# Patient Record
Sex: FEMALE | Race: WHITE | Employment: FULL TIME | URBAN - METROPOLITAN AREA
[De-identification: names, ages, dates, MRNs, and addresses within clinical notes are randomized per-mention and may not be internally consistent; named-entity substitution may affect disease eponyms.]

---

## 2019-02-09 ENCOUNTER — APPOINTMENT (OUTPATIENT)
Dept: GENERAL RADIOLOGY | Age: 24
End: 2019-02-09

## 2019-02-09 ENCOUNTER — HOSPITAL ENCOUNTER (EMERGENCY)
Age: 24
Discharge: HOME | End: 2019-02-09

## 2019-02-09 VITALS
TEMPERATURE: 99.6 F | DIASTOLIC BLOOD PRESSURE: 91 MMHG | OXYGEN SATURATION: 93 % | HEIGHT: 66 IN | WEIGHT: 280 LBS | RESPIRATION RATE: 24 BRPM | SYSTOLIC BLOOD PRESSURE: 124 MMHG | BODY MASS INDEX: 45 KG/M2 | HEART RATE: 113 BPM

## 2019-02-09 DIAGNOSIS — J06.9 ACUTE UPPER RESPIRATORY INFECTION: Primary | ICD-10-CM

## 2019-02-09 LAB
RAPID INFLUENZA  B AGN: NEGATIVE
RAPID INFLUENZA A AGN: NEGATIVE
S PYO AG THROAT QL: NEGATIVE

## 2019-02-09 PROCEDURE — 87081 CULTURE SCREEN ONLY: CPT

## 2019-02-09 PROCEDURE — 94664 DEMO&/EVAL PT USE INHALER: CPT

## 2019-02-09 PROCEDURE — 6370000000 HC RX 637 (ALT 250 FOR IP): Performed by: PHYSICIAN ASSISTANT

## 2019-02-09 PROCEDURE — 87804 INFLUENZA ASSAY W/OPTIC: CPT

## 2019-02-09 PROCEDURE — 71046 X-RAY EXAM CHEST 2 VIEWS: CPT

## 2019-02-09 PROCEDURE — 87880 STREP A ASSAY W/OPTIC: CPT

## 2019-02-09 PROCEDURE — 99283 EMERGENCY DEPT VISIT LOW MDM: CPT

## 2019-02-09 PROCEDURE — 94640 AIRWAY INHALATION TREATMENT: CPT

## 2019-02-09 RX ORDER — PREDNISONE 10 MG/1
50 TABLET ORAL DAILY
Qty: 20 TABLET | Refills: 0 | Status: SHIPPED | OUTPATIENT
Start: 2019-02-09 | End: 2019-02-13

## 2019-02-09 RX ORDER — ALBUTEROL SULFATE 90 UG/1
2 AEROSOL, METERED RESPIRATORY (INHALATION) EVERY 4 HOURS PRN
Qty: 1 INHALER | Refills: 1 | Status: SHIPPED | OUTPATIENT
Start: 2019-02-09 | End: 2022-04-28 | Stop reason: SDUPTHER

## 2019-02-09 RX ORDER — IPRATROPIUM BROMIDE AND ALBUTEROL SULFATE 2.5; .5 MG/3ML; MG/3ML
3 SOLUTION RESPIRATORY (INHALATION) ONCE
Status: COMPLETED | OUTPATIENT
Start: 2019-02-09 | End: 2019-02-09

## 2019-02-09 RX ORDER — CODEINE PHOSPHATE AND GUAIFENESIN 10; 100 MG/5ML; MG/5ML
10 SOLUTION ORAL 3 TIMES DAILY PRN
Qty: 150 ML | Refills: 0 | Status: SHIPPED | OUTPATIENT
Start: 2019-02-09 | End: 2019-02-14

## 2019-02-09 RX ORDER — IBUPROFEN 200 MG
200 TABLET ORAL EVERY 6 HOURS PRN
COMMUNITY

## 2019-02-09 RX ORDER — PREDNISONE 20 MG/1
60 TABLET ORAL ONCE
Status: COMPLETED | OUTPATIENT
Start: 2019-02-09 | End: 2019-02-09

## 2019-02-09 RX ORDER — ACETAMINOPHEN 500 MG
1000 TABLET ORAL ONCE
Status: COMPLETED | OUTPATIENT
Start: 2019-02-09 | End: 2019-02-09

## 2019-02-09 RX ADMIN — ACETAMINOPHEN 1000 MG: 500 TABLET, FILM COATED ORAL at 13:16

## 2019-02-09 RX ADMIN — PREDNISONE 60 MG: 20 TABLET ORAL at 13:16

## 2019-02-09 RX ADMIN — IPRATROPIUM BROMIDE AND ALBUTEROL SULFATE 3 AMPULE: .5; 3 SOLUTION RESPIRATORY (INHALATION) at 12:56

## 2019-02-09 ASSESSMENT — PAIN DESCRIPTION - PAIN TYPE: TYPE: ACUTE PAIN

## 2019-02-09 ASSESSMENT — PAIN SCALES - GENERAL
PAINLEVEL_OUTOF10: 9
PAINLEVEL_OUTOF10: 9

## 2019-02-09 ASSESSMENT — PAIN DESCRIPTION - LOCATION: LOCATION: GENERALIZED

## 2019-02-11 LAB — S PYO THROAT QL CULT: NORMAL

## 2021-02-23 ENCOUNTER — OFFICE VISIT (OUTPATIENT)
Dept: FAMILY MEDICINE CLINIC | Age: 26
End: 2021-02-23
Payer: COMMERCIAL

## 2021-02-23 VITALS
SYSTOLIC BLOOD PRESSURE: 123 MMHG | HEART RATE: 75 BPM | DIASTOLIC BLOOD PRESSURE: 79 MMHG | OXYGEN SATURATION: 98 % | HEIGHT: 66 IN | WEIGHT: 260 LBS | BODY MASS INDEX: 41.78 KG/M2 | TEMPERATURE: 97.3 F

## 2021-02-23 DIAGNOSIS — J45.41 MODERATE PERSISTENT ASTHMA WITH ACUTE EXACERBATION: ICD-10-CM

## 2021-02-23 DIAGNOSIS — J20.9 ACUTE BRONCHITIS, UNSPECIFIED ORGANISM: ICD-10-CM

## 2021-02-23 DIAGNOSIS — Z13.29 SCREENING FOR THYROID DISORDER: ICD-10-CM

## 2021-02-23 DIAGNOSIS — Z00.00 ENCOUNTER FOR PREVENTIVE HEALTH EXAMINATION: Primary | ICD-10-CM

## 2021-02-23 PROBLEM — E66.01 MORBID OBESITY WITH BODY MASS INDEX (BMI) OF 40.0 TO 44.9 IN ADULT (HCC): Status: ACTIVE | Noted: 2021-02-23

## 2021-02-23 PROCEDURE — 99385 PREV VISIT NEW AGE 18-39: CPT | Performed by: NURSE PRACTITIONER

## 2021-02-23 RX ORDER — ACETAMINOPHEN 500 MG
500 TABLET ORAL EVERY 6 HOURS PRN
COMMUNITY
Start: 2021-01-30 | End: 2021-02-23

## 2021-02-23 RX ORDER — PREDNISONE 20 MG/1
TABLET ORAL
COMMUNITY
Start: 2021-02-19 | End: 2021-02-23

## 2021-02-23 RX ORDER — METHYLPREDNISOLONE 4 MG/1
TABLET ORAL
COMMUNITY
Start: 2021-02-01 | End: 2021-02-23

## 2021-02-23 RX ORDER — AMOXICILLIN 500 MG/1
CAPSULE ORAL
COMMUNITY
Start: 2020-12-30 | End: 2021-02-23

## 2021-02-23 RX ORDER — METHOCARBAMOL 500 MG/1
TABLET, FILM COATED ORAL
COMMUNITY
Start: 2021-01-30 | End: 2021-02-23

## 2021-02-23 RX ORDER — NAPROXEN 500 MG/1
TABLET ORAL
COMMUNITY
Start: 2021-01-30 | End: 2022-04-28

## 2021-02-23 RX ORDER — AZITHROMYCIN 250 MG/1
250 TABLET, FILM COATED ORAL SEE ADMIN INSTRUCTIONS
Qty: 6 TABLET | Refills: 0 | Status: SHIPPED | OUTPATIENT
Start: 2021-02-23 | End: 2021-02-28

## 2021-02-23 RX ORDER — MONTELUKAST SODIUM 10 MG/1
TABLET ORAL
COMMUNITY
Start: 2021-02-20 | End: 2022-04-28

## 2021-02-23 RX ORDER — DEXTROMETHORPHAN HYDROBROMIDE AND PROMETHAZINE HYDROCHLORIDE 15; 6.25 MG/5ML; MG/5ML
5 SYRUP ORAL
COMMUNITY
End: 2021-02-23

## 2021-02-23 RX ORDER — PREDNISONE 20 MG/1
20 TABLET ORAL 2 TIMES DAILY
Qty: 10 TABLET | Refills: 0 | Status: SHIPPED | OUTPATIENT
Start: 2021-02-23 | End: 2021-02-28

## 2021-02-23 RX ORDER — AZITHROMYCIN 500 MG/1
TABLET, FILM COATED ORAL
COMMUNITY
Start: 2021-01-31 | End: 2021-02-23

## 2021-02-23 RX ORDER — IPRATROPIUM BROMIDE 42 UG/1
SPRAY, METERED NASAL
COMMUNITY
Start: 2021-02-20 | End: 2022-04-28

## 2021-02-23 ASSESSMENT — ENCOUNTER SYMPTOMS
CHEST TIGHTNESS: 1
SHORTNESS OF BREATH: 1
WHEEZING: 1
COUGH: 1

## 2021-02-23 NOTE — PROGRESS NOTES
Scarlett Welch  : 1995  Encounter date: 2021    This sue 23 y.o. female who presents with  Chief Complaint   Patient presents with    Establish Care     request thyroid lab work & discuss asthma        History of present illness:    HPI   History and Physical      Scarlett Welch  YOB: 1995    Date of Service:  2021    Chief Complaint:   Scarlett Welch is a 23 y.o. female who  presents for physical examination. HPI: Pt is 23year old female for annual exam, new to practice, due for labs. Pt reports concerns about uncontrolled asthma, reports taking albuterol HFA, singulair and started on Dulera but insurance does not cover. Pt reports has had to go to ED/urgent care for asthma attacks in past.  Pt reports 2 episodes in last month.      Wt Readings from Last 3 Encounters:   21 260 lb (117.9 kg)   19 280 lb (127 kg)     BP Readings from Last 3 Encounters:   21 123/79   19 (!) 124/91       Patient Active Problem List   Diagnosis    Moderate persistent asthma with acute exacerbation    Morbid obesity with body mass index (BMI) of 40.0 to 44.9 in adult [facility])       Preventive Care:  Health Maintenance   Topic Date Due    Hepatitis C screen  1997    Varicella vaccine (2 of 2 - 2-dose childhood series) 2001    Pneumococcal 0-64 years Vaccine (1 of 1 - PPSV23) 2003    HPV vaccine (1 - 2-dose series) 2008    DTaP/Tdap/Td vaccine (6 - Tdap) 2008    HIV screen  2012    Chlamydia screen  2013    Cervical cancer screen  2018    Flu vaccine (1) 2020    Hepatitis B vaccine  Completed    Hib vaccine  Completed    Hepatitis A vaccine  Aged Out    Meningococcal (ACWY) vaccine  Aged Out      Hx abnormal PAP: no  Sexual activity: none   Self-breast exams: yes  Previous DEXA scan: no  Last eye exam:   Exercise: no regular exercise  Lipid panel: No results found for: CHOL, TRIG, HDL, LDLCALC, LDLDIRECT Substance and Sexual Activity    Alcohol use: No    Drug use: Not on file    Sexual activity: Not on file   Lifestyle    Physical activity     Days per week: Not on file     Minutes per session: Not on file    Stress: Not on file   Relationships    Social connections     Talks on phone: Not on file     Gets together: Not on file     Attends Sikhism service: Not on file     Active member of club or organization: Not on file     Attends meetings of clubs or organizations: Not on file     Relationship status: Not on file    Intimate partner violence     Fear of current or ex partner: Not on file     Emotionally abused: Not on file     Physically abused: Not on file     Forced sexual activity: Not on file   Other Topics Concern    Not on file   Social History Narrative    Not on file       Review of Systems:  A comprehensive review of systems was negative except for what was noted in the HPI. Physical Exam:   Vitals:    02/23/21 1226 02/23/21 1239   BP: 123/79    Pulse: 75    Temp: 97.3 °F (36.3 °C)    SpO2:  98%   Weight: 260 lb (117.9 kg)    Height: 5' 6.25\" (1.683 m)      Body mass index is 41.65 kg/m². Constitutional: She is oriented to person, place, and time. She appears well-developed and well-nourished. No distress. HEENT:   Head: Normocephalic and atraumatic. Right Ear: Tympanic membrane, external ear and ear canal normal.   Left Ear: Tympanic membrane, external ear and ear canal normal.   Nose: Nose normal.   Mouth/Throat: Oropharynx is clear and moist, and mucous membranes are normal.  There is no cervical adenopathy. Eyes: Conjunctivae and extraocular motions are normal. Pupils are equal, round, and reactive to light. Neck: Neck supple. No JVD present. Carotid bruit is not present. No mass and no thyromegaly present. Cardiovascular: Normal rate, regular rhythm, normal heart sounds and intact distal pulses. Exam reveals no gallop and no friction rub.   No murmur heard.  Pulmonary/Chest: Effort normal and breath sounds normal. No respiratory distress. She has no wheezes, rhonchi or rales. Abdominal: Soft, non-tender. Bowel sounds and aorta are normal. She exhibits no organomegaly, mass or bruit. Breast exam:  not examined. Musculoskeletal: Normal range of motion, no synovitis. She exhibits no edema. Neurological: She is alert and oriented to person, place, and time. She has normal reflexes. No cranial nerve deficit. Coordination normal.   Skin: Skin is warm and dry. There is no rash or erythema. No suspicious lesions noted. Psychiatric: She has a normal mood and affect. Her speech is normal and behavior is normal. Judgment, cognition and memory are normal.     Assessment/Plan:    El Bullock was seen today for establish care. Diagnoses and all orders for this visit:    Encounter for preventive health examination  -     CBC Auto Differential; Future  -     Comprehensive Metabolic Panel, Fasting; Future  -     Lipid, Fasting; Future  -     HIV Screen; Future  -     HEPATITIS C ANTIBODY; Future    Screening for thyroid disorder  -     TSH with Reflex; Future    Moderate persistent asthma with acute exacerbation  -     fluticasone-salmeterol (ADVAIR HFA) 115-21 MCG/ACT inhaler; Inhale 2 puffs into the lungs 2 times daily    Acute bronchitis, unspecified organism  -     predniSONE (DELTASONE) 20 MG tablet; Take 1 tablet by mouth 2 times daily for 5 days  -     azithromycin (ZITHROMAX) 250 MG tablet;  Take 1 tablet by mouth See Admin Instructions for 5 days 500mg on day 1 followed by 250mg on days 2 - 5              Current Outpatient Medications on File Prior to Visit   Medication Sig Dispense Refill    ipratropium (ATROVENT) 0.06 % nasal spray       montelukast (SINGULAIR) 10 MG tablet       ibuprofen (ADVIL;MOTRIN) 200 MG tablet Take 200 mg by mouth every 6 hours as needed for Pain      naproxen (NAPROSYN) 500 MG tablet TAKE 1 TABLET BY MOUTH TWICE DAILY WITH MEALS  albuterol sulfate HFA (PROVENTIL HFA) 108 (90 Base) MCG/ACT inhaler Inhale 2 puffs into the lungs every 4 hours as needed for Wheezing or Shortness of Breath With spacer (and mask if indicated). Thanks. 1 Inhaler 1     No current facility-administered medications on file prior to visit. No Known Allergies  Past Medical History:   Diagnosis Date    Asthma       History reviewed. No pertinent surgical history. History reviewed. No pertinent family history. Social History     Tobacco Use    Smoking status: Current Every Day Smoker     Packs/day: 0.50    Smokeless tobacco: Never Used   Substance Use Topics    Alcohol use: No        Review of Systems   Constitutional: Negative for fever. Respiratory: Positive for cough (productive), chest tightness, shortness of breath and wheezing. Cardiovascular: Negative for chest pain, palpitations and leg swelling. Objective:    /79   Pulse 75   Temp 97.3 °F (36.3 °C)   Ht 5' 6.25\" (1.683 m)   Wt 260 lb (117.9 kg)   SpO2 98%   BMI 41.65 kg/m²   Weight: 260 lb (117.9 kg)     BP Readings from Last 3 Encounters:   02/23/21 123/79   02/09/19 (!) 124/91     Wt Readings from Last 3 Encounters:   02/23/21 260 lb (117.9 kg)   02/09/19 280 lb (127 kg)     BMI Readings from Last 3 Encounters:   02/23/21 41.65 kg/m²   02/09/19 45.19 kg/m²       Physical Exam    Assessment/Plan    1. Encounter for preventive health examination  Advised routine dental and vision  Advised living will  Advised increase exercise, healthy diet choices  Advised HPV vaccination  - CBC Auto Differential; Future  - Comprehensive Metabolic Panel, Fasting; Future  - Lipid, Fasting; Future  - HIV Screen; Future  - HEPATITIS C ANTIBODY; Future    2. Screening for thyroid disorder  - TSH with Reflex; Future    3. Moderate persistent asthma with acute exacerbation  - fluticasone-salmeterol (ADVAIR HFA) 115-21 MCG/ACT inhaler;  Inhale 2 puffs into the lungs 2 times daily  Dispense: 1 Inhaler; Refill: 3    4. Acute bronchitis, unspecified organism  - predniSONE (DELTASONE) 20 MG tablet; Take 1 tablet by mouth 2 times daily for 5 days  Dispense: 10 tablet; Refill: 0  - azithromycin (ZITHROMAX) 250 MG tablet; Take 1 tablet by mouth See Admin Instructions for 5 days 500mg on day 1 followed by 250mg on days 2 - 5  Dispense: 6 tablet; Refill: 0          Return in about 1 year (around 2/23/2022) for annual check up. This dictation was generated by voice recognition computer software. Although all attempts are made to edit the dictation for accuracy, there may be errors in the transcription that are not intended.

## 2021-08-12 DIAGNOSIS — J45.41 MODERATE PERSISTENT ASTHMA WITH ACUTE EXACERBATION: ICD-10-CM

## 2021-08-12 NOTE — TELEPHONE ENCOUNTER
Medication:   Pending Prescriptions Disp Refills    fluticasone-salmeterol (ADVAIR HFA) 115-21 MCG/ACT inhaler 1 Inhaler 3     Sig: Inhale 2 puffs into the lungs 2 times daily      Patient Phone Number: [phone] (home)     Last appt: 2/23/2021   Next appt: Visit date not found    Preferred Pharmacy:   [facility] [phone] [facility] [phone]

## 2021-12-22 DIAGNOSIS — J45.41 MODERATE PERSISTENT ASTHMA WITH ACUTE EXACERBATION: ICD-10-CM

## 2021-12-22 NOTE — TELEPHONE ENCOUNTER
Medication:   Requested Prescriptions     Pending Prescriptions Disp Refills    fluticasone-salmeterol (ADVAIR HFA) 115-21 MCG/ACT inhaler       Sig: Inhale 2 puffs into the lungs 2 times daily      Last Filled:      Patient Phone Number: [phone] (home)     Last appt: 2/23/2021   Next appt: Visit date not found    Last OARRS: No flowsheet data found.   PDMP Monitoring:    Last PDMP Florinda Marley as Reviewed [facility]):  Review User Review Instant Review Result          Preferred Pharmacy:   [address] [phone] [facility] [phone]  [address]  [facility] [address]  Phone: [phone] Fax: [phone]

## 2022-03-10 DIAGNOSIS — J45.41 MODERATE PERSISTENT ASTHMA WITH ACUTE EXACERBATION: ICD-10-CM

## 2022-03-10 NOTE — TELEPHONE ENCOUNTER
Medication:   Requested Prescriptions     Pending Prescriptions Disp Refills    fluticasone-salmeterol (ADVAIR HFA) 115-21 MCG/ACT inhaler 1 each 0     Sig: Inhale 2 puffs into the lungs 2 times daily **Patient due for an appt**        Last Filled: 12/22/2021      Patient Phone Number: [phone] (home)     Last appt: 2/23/2021   Next appt: Visit date not found    Last OARRS: No flowsheet data found.

## 2022-04-18 DIAGNOSIS — J45.41 MODERATE PERSISTENT ASTHMA WITH ACUTE EXACERBATION: ICD-10-CM

## 2022-04-19 NOTE — TELEPHONE ENCOUNTER
Medication:   Requested Prescriptions     Pending Prescriptions Disp Refills    fluticasone-salmeterol (ADVAIR HFA) 115-21 MCG/ACT inhaler 1 each 0     Sig: Inhale 2 puffs into the lungs 2 times daily **Patient due for an appt**      Last Filled:      Patient Phone Number: [phone] (home)     Last appt: 2/23/2021   Next appt: Visit date not found    Last OARRS: No flowsheet data found.   PDMP Monitoring:    Last PDMP Merced smith Reviewed [facility]):  Review User Review Instant Review Result          Preferred Pharmacy:   [address] [phone] [facility] [phone]  [address]  [address] [place] [id number]  Phone: [phone] Fax: [phone]

## 2022-04-28 ENCOUNTER — OFFICE VISIT (OUTPATIENT)
Dept: FAMILY MEDICINE CLINIC | Age: 27
End: 2022-04-28
Payer: COMMERCIAL

## 2022-04-28 VITALS
DIASTOLIC BLOOD PRESSURE: 88 MMHG | WEIGHT: 275 LBS | OXYGEN SATURATION: 99 % | SYSTOLIC BLOOD PRESSURE: 122 MMHG | HEIGHT: 67 IN | TEMPERATURE: 98 F | BODY MASS INDEX: 43.16 KG/M2 | HEART RATE: 91 BPM

## 2022-04-28 DIAGNOSIS — J45.41 MODERATE PERSISTENT ASTHMA WITH ACUTE EXACERBATION: ICD-10-CM

## 2022-04-28 DIAGNOSIS — F32.A DEPRESSION, UNSPECIFIED DEPRESSION TYPE: ICD-10-CM

## 2022-04-28 DIAGNOSIS — F10.930 ALCOHOL WITHDRAWAL SYNDROME WITHOUT COMPLICATION (HCC): Primary | ICD-10-CM

## 2022-04-28 DIAGNOSIS — R73.9 HYPERGLYCEMIA: ICD-10-CM

## 2022-04-28 DIAGNOSIS — Z00.00 ENCOUNTER FOR PREVENTIVE HEALTH EXAMINATION: Primary | ICD-10-CM

## 2022-04-28 PROBLEM — R50.9 FEVER: Status: ACTIVE | Noted: 2022-04-28

## 2022-04-28 PROBLEM — R05.9 COUGH: Status: ACTIVE | Noted: 2022-04-28

## 2022-04-28 PROBLEM — J01.90 ACUTE SINUSITIS: Status: ACTIVE | Noted: 2022-04-28

## 2022-04-28 PROCEDURE — 99214 OFFICE O/P EST MOD 30 MIN: CPT | Performed by: NURSE PRACTITIONER

## 2022-04-28 RX ORDER — ACYCLOVIR 800 MG/1
TABLET ORAL
COMMUNITY
Start: 2022-04-24

## 2022-04-28 RX ORDER — BUPROPION HYDROCHLORIDE 150 MG/1
150 TABLET ORAL EVERY MORNING
Qty: 30 TABLET | Refills: 0 | Status: SHIPPED | OUTPATIENT
Start: 2022-04-28 | End: 2022-05-27 | Stop reason: SDUPTHER

## 2022-04-28 RX ORDER — HYDROXYZINE PAMOATE 25 MG/1
25 CAPSULE ORAL 2 TIMES DAILY PRN
Qty: 60 CAPSULE | Refills: 0 | Status: SHIPPED | OUTPATIENT
Start: 2022-04-28 | End: 2022-10-13

## 2022-04-28 RX ORDER — ALBUTEROL SULFATE 90 UG/1
2 AEROSOL, METERED RESPIRATORY (INHALATION) EVERY 4 HOURS PRN
Status: CANCELLED | OUTPATIENT
Start: 2022-04-28 | End: 2022-05-28

## 2022-04-28 RX ORDER — ALBUTEROL SULFATE 90 UG/1
2 AEROSOL, METERED RESPIRATORY (INHALATION) EVERY 4 HOURS PRN
Qty: 2 EACH | Refills: 1 | Status: SHIPPED | OUTPATIENT
Start: 2022-04-28 | End: 2022-09-12

## 2022-04-28 SDOH — ECONOMIC STABILITY: FOOD INSECURITY: WITHIN THE PAST 12 MONTHS, THE FOOD YOU BOUGHT JUST DIDN'T LAST AND YOU DIDN'T HAVE MONEY TO GET MORE.: NEVER TRUE

## 2022-04-28 SDOH — ECONOMIC STABILITY: FOOD INSECURITY: WITHIN THE PAST 12 MONTHS, YOU WORRIED THAT YOUR FOOD WOULD RUN OUT BEFORE YOU GOT MONEY TO BUY MORE.: NEVER TRUE

## 2022-04-28 ASSESSMENT — PATIENT HEALTH QUESTIONNAIRE - PHQ9
SUM OF ALL RESPONSES TO PHQ QUESTIONS 1-9: 9
SUM OF ALL RESPONSES TO PHQ QUESTIONS 1-9: 8
9. THOUGHTS THAT YOU WOULD BE BETTER OFF DEAD, OR OF HURTING YOURSELF: 1
SUM OF ALL RESPONSES TO PHQ QUESTIONS 1-9: 9
2. FEELING DOWN, DEPRESSED OR HOPELESS: 1
1. LITTLE INTEREST OR PLEASURE IN DOING THINGS: 2
7. TROUBLE CONCENTRATING ON THINGS, SUCH AS READING THE NEWSPAPER OR WATCHING TELEVISION: 1
3. TROUBLE FALLING OR STAYING ASLEEP: 0
6. FEELING BAD ABOUT YOURSELF - OR THAT YOU ARE A FAILURE OR HAVE LET YOURSELF OR YOUR FAMILY DOWN: 1
4. FEELING TIRED OR HAVING LITTLE ENERGY: 2
SUM OF ALL RESPONSES TO PHQ QUESTIONS 1-9: 9
10. IF YOU CHECKED OFF ANY PROBLEMS, HOW DIFFICULT HAVE THESE PROBLEMS MADE IT FOR YOU TO DO YOUR WORK, TAKE CARE OF THINGS AT HOME, OR GET ALONG WITH OTHER PEOPLE: 1
8. MOVING OR SPEAKING SO SLOWLY THAT OTHER PEOPLE COULD HAVE NOTICED. OR THE OPPOSITE, BEING SO FIGETY OR RESTLESS THAT YOU HAVE BEEN MOVING AROUND A LOT MORE THAN USUAL: 0
5. POOR APPETITE OR OVEREATING: 1
SUM OF ALL RESPONSES TO PHQ9 QUESTIONS 1 & 2: 3

## 2022-04-28 ASSESSMENT — SOCIAL DETERMINANTS OF HEALTH (SDOH): HOW HARD IS IT FOR YOU TO PAY FOR THE VERY BASICS LIKE FOOD, HOUSING, MEDICAL CARE, AND HEATING?: NOT VERY HARD

## 2022-04-28 ASSESSMENT — COLUMBIA-SUICIDE SEVERITY RATING SCALE - C-SSRS
6. HAVE YOU EVER DONE ANYTHING, STARTED TO DO ANYTHING, OR PREPARED TO DO ANYTHING TO END YOUR LIFE?: NO
1. WITHIN THE PAST MONTH, HAVE YOU WISHED YOU WERE DEAD OR WISHED YOU COULD GO TO SLEEP AND NOT WAKE UP?: YES
2. HAVE YOU ACTUALLY HAD ANY THOUGHTS OF KILLING YOURSELF?: NO

## 2022-04-28 NOTE — PATIENT INSTRUCTIONS
Patient Education        Alcohol Detoxification and Withdrawal: Care Instructions  Your Care Instructions     If you drink alcohol regularly and then suddenly stop, you may go through some physical and emotional problems while the alcohol clears out of your system. Clearing the alcohol from your body is called detoxification, or detox. Physical and emotional problems that may happen during detox are calledwithdrawal.  Symptoms of withdrawal can be scary and dangerous. Mild symptoms include nausea and vomiting, sweating, shakiness, and intense worry. Severe symptoms include being confused and irritable, feeling things on your body that are not there, seeing or hearing things that are not there, and trembling. You may even have seizures. If your symptoms become severe you must see a doctor. People who drink large amounts of alcohol should not try to detox at home. A person migdalia of severe alcohol withdrawal.  Symptoms of alcohol withdrawal may begin from 4 to 12 hours after you stop drinking. But they may not start for several days after the last drink. Theycan last a few days. It is hard to stop drinking. But when you have cleared the alcohol from your system, you will be able to start the next part of your life, free from theburden of being dependent. Follow-up care is a key part of your treatment and safety. Be sure to make and go to all appointments, and call your doctor if you are having problems. It's also a good idea to know your test results and keep alist of the medicines you take. How can you care for yourself at home?  Before you stop drinking, talk to your doctor about how you plan to stop. Be sure to be completely honest with him or her about how much you have been drinking. Your doctor will figure out whether you need to detox in a supervised medical center.  Take your medicines exactly as prescribed. Call your doctor if you think you are having a problem with your medicine.    Make sure someone you trust is with you the whole time. Have friends and family members take turns staying with you until you are done with detox.  Put a list of emergency numbers near the phone. This should include your doctor, the police, the nearest hospital and emergency room, and neighbors who can help if needed.  Make sure all alcohol is removed from the house before you start. This includes beverages as well as medicines, rubbing alcohol, and certain flavorings like vanilla extract.  Keep \"drinking buddies\" away during the time you are going through detox.  Make your surroundings calm. Soft lights, soft music, and a comfortable place to sit or lie down can help make the process easier.  Drink lots of fluids and eat snacks such as fruit, cheese and crackers, and pretzels. Foods high in carbohydrate may help reduce the craving for alcohol.  Understand that detox is going to be hard.  Keep in mind that the people watching over you during detox are there to help. Explain to them before you start that you may not act like yourself until detox is finished.  Consider joining a support group such as Alcoholics Anonymous. Sharing your experiences with other people who face similar challenges may help you feel less overwhelmed.  Keep the numbers for these national suicide hotlines: 9-778-507-TALK (6-207.600.1564) and 3-623-HOHLFRH (2-571.876.9532). If you or someone you know talks about suicide or feeling hopeless, get help right away. When should you call for help? Call 911 anytime you think you may need emergency care.  For example, call if:     You feel you cannot stop from hurting yourself or someone else.      You vomit many times and cannot stop.      You vomit blood or what looks like coffee grounds.      You have trouble breathing or are breathing very fast.      Your heart beats more than 120 times a minute and will not slow down.      You have chest pain.      You have a seizure.      You see or feel things that are not there (hallucinate). If you are caring for someone who is going through detox, call if:     The person passes out (loses consciousness).      The person sees or feels things that are not there and sees or hears the same things many times.      The person is very agitated and will not calm down.      The person becomes violent or threatens to be violent.      The person has a seizure. Call your doctor now or seek immediate medical care if:     You have a high fever.      You have severe belly pain.      You are very shaky. Watch closely for changes in your health, and be sure to contact your doctor if:     You do not get better as expected. Where can you learn more? Go to https://AppPowerGrouppepiceweb.[employer]. org and sign in to your [employer] account. Enter [phone] in the CR2 box to learn more about \"Alcohol Detoxification and Withdrawal: Care Instructions. \"     If you do not have an account, please click on the \"Sign Up Now\" link. Current as of: November 8, 2021               Content Version: 13.2  © 2868-9291 [employer]. Care instructions adapted under license by [facility] ([facility]). If you have questions about a medical condition or this instruction, always ask your healthcare professional. [place] [address] any warranty or liability for your use of this information. Patient Education        Learning About Alcohol Use Disorder  What is alcohol use disorder? Alcohol use disorder means that a person drinks alcohol even though it causes harm to themselves or others. It can range from mild to severe. The more signs of this disorder you have, the more severe it may be. Moderate to severe alcohol use disorder is sometimes called addiction. People who have it may findit hard to control their use of alcohol. People who have this disorder may argue with others about how much they're drinking.  Their job may be affected because of drinking. They may drink when it's dangerous or illegal, such as when they drive. They also may have a strong need, or craving, to drink. They may feel like they must drink just to get by. Their drinking may increase their risk of getting hurt or being in a car crash. Over time, drinking too much alcohol may cause health problems. These mayinclude high blood pressure, liver problems, or problems with digestion. What are the signs? Maybe you've wondered about your alcohol habits, or how to tell if yourdrinking is becoming a problem. Here are some of the signs of alcohol use disorder. You may have it if you havetwo or more of the following signs:   You drink larger amounts of alcohol than you ever meant to. Or you've been drinking for a longer time than you ever meant to.  You can't cut down or control your use. Or you constantly wish you could cut down.  You spend a lot of time getting or drinking alcohol or recovering from its effects.  You have strong cravings for alcohol.  You can no longer do your main jobs at work, at school, or at home.  You keep drinking alcohol, even though your use hurts your relationships.  You have stopped doing important activities because of your alcohol use.  You drink alcohol in situations where doing so is dangerous.  You keep drinking alcohol even though you know it's causing health problems.  You need more and more alcohol to get the same effect, or you get less effect from the same amount over time. This is called tolerance.  You have uncomfortable symptoms when you stop drinking alcohol or use less. This is called withdrawal.  Alcohol use disorder can range from mild to severe. The more signs you have, the more severe the disorder may be. Moderate to severe alcohol use disorder issometimes called addiction. You might not realize that your drinking is a problem. You might not drink large amounts when you drink.  Or you might go for days or weeks between drinking episodes. But even if you don't drink very often, your drinking couldstill be harmful and put you at risk. How is alcohol use disorder treated? Getting help is up to you. But you don't have to do it alone. There are manypeople and kinds of treatments that can help. Treatment for alcohol use disorder can include:   Group therapy, one or more types of counseling, and alcohol education.  Medicines that help to:  ? Reduce withdrawal symptoms and help you safely stop drinking. ? Reduce cravings for alcohol.  Support groups. These groups include Alcoholics Anonymous and [employer] (Self-Management and Recovery Training). Some people are able to stop or cut back on drinking with help from a counselor. People who have moderate to severe alcohol use disorder may needmedical treatment. They may need to stay in a hospital or treatment center. You may have a treatment team to help you. This team may include a psychologist or psychiatrist, counselors, doctors, social workers, nurses, and a casemanager. A  helps plan and manage your treatment. Follow-up care is a key part of your treatment and safety. Be sure to make and go to all appointments, and call your doctor if you are having problems. It's also a good idea to know your test results and keep alist of the medicines you take. Where can you learn more? Go to https://St[employer] T[employer] ProductskathieDigly.[employer]. org and sign in to your [employer] account. Enter [id number] in the [facility] box to learn more about \"Learning About Alcohol Use Disorder. \"     If you do not have an account, please click on the \"Sign Up Now\" link. Current as of: November 8, 2021               Content Version: 13.2  © 4583-0021 [employer]. Care instructions adapted under license by [facility] ([facility]).  If you have questions about a medical condition or this instruction, always ask your healthcare professional. [address] any warranty or liability for your use of this information.

## 2022-04-28 NOTE — PROGRESS NOTES
Rosario Gale  : 1995  Encounter date: 2022    This sue 24 y.o. female who presents with  Chief Complaint   Patient presents with    Other     alcoholism, mental health concerns       History of present illness:    HPI PT is 24year old female with concerns regarding alcoholism and depression. Denies feelings of self harm or suicide. PT has never addressed issues. Reports mother is recovering IV drug user, lives with her in home. Currently employed. Pt reports only 1 close friend who does not engage in excessive drinking, mother has voiced concerns about alcoholic consumption but will at times drink with patient. Pt reports consuming 0.5 liter vodka every other day. Denies legal ramifications, has experienced black outs and reports feeling hung over and sick the following days. Pt has tried to decrease drinking but is worrisome of withdrawal, has not experienced DT's or withdrawal symptoms in past.  Pt with history of regular habit of drinking poppy seed tea, ED visit 11/10/2020 for drug consumption. Pt reports wanting to stop drinking ETOH, knows the physical consequences. PT reports isolating self and having little drive. Pt most likely with significant childhood concerns. 2017 ED visit for suicidal ideation without plan. Pt reports having never been treated with psychotropics, ED mention of possible Adderal use. Current Outpatient Medications on File Prior to Visit   Medication Sig Dispense Refill    acyclovir (ZOVIRAX) 800 MG tablet TAKE 1 TABLET BY MOUTH TWICE A DAY ( EVERY 12 HOURS ) FOR 5 DAYS AT THE OUTBREAK OF SYMPTOMS      ibuprofen (ADVIL;MOTRIN) 200 MG tablet Take 200 mg by mouth every 6 hours as needed for Pain       No current facility-administered medications on file prior to visit. No Known Allergies  Past Medical History:   Diagnosis Date    Asthma       History reviewed. No pertinent surgical history.    Family History   Problem Relation Age of Onset    Asthma Sister     Immune Disorder Sister     Mental Illness Sister     Obesity Sister     Asthma Father     Mental Illness Father     Obesity Father     Breast Cancer Maternal Grandmother     Diabetes Maternal Grandmother     Obesity Maternal Grandmother     Diabetes Paternal Grandfather     Mental Illness Mother     Obesity Mother     Substance Abuse Mother     Substance Abuse Maternal Aunt       Social History     Tobacco Use    Smoking status: Former Smoker     Packs/day: 0.50     Years: 8.00     Pack years: 4.00     Types: Cigarettes    Smokeless tobacco: Never Used   Substance Use Topics    Alcohol use: Yes     Alcohol/week: 0.0 standard drinks        Review of Systems    Objective:    /88 (Site: Left Upper Arm, Position: Sitting, Cuff Size: Large Adult)   Pulse 91   Temp 98 °F (36.7 °C) (Infrared)   Ht 5' 6.5\" (1.689 m)   Wt 275 lb (124.7 kg)   LMP 04/08/2022   SpO2 99%   BMI 43.72 kg/m²   Weight: 275 lb (124.7 kg)     BP Readings from Last 3 Encounters:   04/28/22 122/88   02/23/21 123/79   02/09/19 (!) 124/91     Wt Readings from Last 3 Encounters:   04/28/22 275 lb (124.7 kg)   02/23/21 260 lb (117.9 kg)   02/09/19 280 lb (127 kg)     BMI Readings from Last 3 Encounters:   04/28/22 43.72 kg/m²   02/23/21 41.65 kg/m²   02/09/19 45.19 kg/m²       Physical Exam  Constitutional:       Appearance: Normal appearance. She is obese. Cardiovascular:      Rate and Rhythm: Normal rate and regular rhythm. Pulses: Normal pulses. Heart sounds: Normal heart sounds. Skin:     General: Skin is warm and dry. Neurological:      Mental Status: She is oriented to person, place, and time. Psychiatric:      Comments: Flat affect         Assessment/Plan    1.  Alcohol withdrawal syndrome without complication ([address]  Advised support system  Discussed need for AA and sponsor  Provided information for [facility]  Discussed s/s ETOH withdrawal and medical emergency  Provided information for cessation and withdrawal  - buPROPion (WELLBUTRIN XL) 150 MG extended release tablet; Take 1 tablet by mouth every morning  Dispense: 30 tablet; Refill: 0  - hydrOXYzine (VISTARIL) 25 MG capsule; Take 1 capsule by mouth 2 times daily as needed for Anxiety  Dispense: 60 capsule; Refill: 0    2. Depression, unspecified depression type  Advised counseling  Self care  - hydrOXYzine (VISTARIL) 25 MG capsule; Take 1 capsule by mouth 2 times daily as needed for Anxiety  Dispense: 60 capsule; Refill: 0    3. Moderate persistent asthma with acute exacerbation  Refill sent  - fluticasone-salmeterol (ADVAIR HFA) 115-21 MCG/ACT inhaler; Inhale 2 puffs into the lungs 2 times daily  Dispense: 2 each; Refill: 1  - albuterol sulfate HFA (PROVENTIL HFA) 108 (90 Base) MCG/ACT inhaler; Inhale 2 puffs into the lungs every 4 hours as needed for Wheezing or Shortness of Breath With spacer (and mask if indicated). Thanks. Dispense: 2 each; Refill: 1      Return in about 1 month (around 5/28/2022) for medication re-check. This dictation was generated by voice recognition computer software. Although all attempts are made to edit the dictation for accuracy, there may be errors in the transcription that are not intended.

## 2022-05-01 LAB
A/G RATIO: 2 (ref 1.2–2.2)
ALBUMIN SERPL-MCNC: 4.3 G/DL (ref 3.9–5)
ALP BLD-CCNC: 64 IU/L (ref 44–121)
ALT SERPL-CCNC: 17 IU/L (ref 0–32)
AST SERPL-CCNC: 19 IU/L (ref 0–40)
BASOPHILS ABSOLUTE: 0.1 X10E3/UL (ref 0–0.2)
BASOPHILS RELATIVE PERCENT: 1 %
BILIRUB SERPL-MCNC: 0.4 MG/DL (ref 0–1.2)
BUN / CREAT RATIO: 20 (ref 9–23)
BUN BLDV-MCNC: 14 MG/DL (ref 6–20)
CALCIUM SERPL-MCNC: 9.1 MG/DL (ref 8.7–10.2)
CHLORIDE BLD-SCNC: 106 MMOL/L (ref 96–106)
CHOLESTEROL, TOTAL: 135 MG/DL (ref 100–199)
CO2: 20 MMOL/L (ref 20–29)
COMMENT: NORMAL
CREAT SERPL-MCNC: 0.69 MG/DL (ref 0.57–1)
EOSINOPHILS ABSOLUTE: 0.4 X10E3/UL (ref 0–0.4)
EOSINOPHILS RELATIVE PERCENT: 5 %
ERYTHROCYTES, NUCLEATED/100 LEU: NORMAL
ESTIMATED GLOMERULAR FILTRATION RATE CREATININE EQUATION: 123 ML/MIN/1.73
GLOBULIN: 2.1 G/DL (ref 1.5–4.5)
GLUCOSE BLD-MCNC: 86 MG/DL (ref 65–99)
HBA1C MFR BLD: 5.2 % (ref 4.8–5.6)
HCT VFR BLD CALC: 40.9 % (ref 34–46.6)
HDLC SERPL-MCNC: 55 MG/DL
HEMOGLOBIN: 13.4 G/DL (ref 11.1–15.9)
IMMATURE CELLS ABSOLUTE COUNT: NORMAL
IMMATURE GRANS (ABS): 0 X10E3/UL (ref 0–0.1)
IMMATURE GRANULOCYTES: 0 %
LDL CHOLESTEROL CALCULATED: 63 MG/DL (ref 0–99)
LYMPHOCYTES ABSOLUTE: 1.9 X10E3/UL (ref 0.7–3.1)
LYMPHOCYTES RELATIVE PERCENT: 28 %
MAGNESIUM: 2.1 MG/DL (ref 1.6–2.3)
MCH RBC QN AUTO: 30.9 PG (ref 26.6–33)
MCHC RBC AUTO-ENTMCNC: 32.8 G/DL (ref 31.5–35.7)
MCV RBC AUTO: 94 FL (ref 79–97)
MONOCYTES ABSOLUTE: 0.6 X10E3/UL (ref 0.1–0.9)
MONOCYTES RELATIVE PERCENT: 9 %
MORPHOLOGY: NORMAL
NEUTROPHILS ABSOLUTE: 3.9 X10E3/UL (ref 1.4–7)
PDW BLD-RTO: 12.7 % (ref 11.7–15.4)
PLATELET # BLD: 252 X10E3/UL (ref 150–450)
POTASSIUM SERPL-SCNC: 4.3 MMOL/L (ref 3.5–5.2)
RBC # BLD: 4.34 X10E6/UL (ref 3.77–5.28)
SEGMENTED NEUTROPHILS RELATIVE PERCENT: 57 %
SODIUM BLD-SCNC: 141 MMOL/L (ref 134–144)
TOTAL PROTEIN: 6.4 G/DL (ref 6–8.5)
TRIGL SERPL-MCNC: 91 MG/DL (ref 0–149)
VLDLC SERPL CALC-MCNC: 17 MG/DL (ref 5–40)
WBC # BLD: 6.7 X10E3/UL (ref 3.4–10.8)

## 2022-05-27 DIAGNOSIS — F10.930 ALCOHOL WITHDRAWAL SYNDROME WITHOUT COMPLICATION (HCC): ICD-10-CM

## 2022-05-27 NOTE — TELEPHONE ENCOUNTER
Medication:   Requested Prescriptions     Pending Prescriptions Disp Refills    buPROPion (WELLBUTRIN XL) 150 MG extended release tablet 30 tablet 0     Sig: Take 1 tablet by mouth every morning          Patient Phone Number: [phone] (home)     Last appt: 4/28/2022   Next appt: 6/6/2022    Last OARRS: No flowsheet data found.   PDMP Monitoring:    Last PDMP David Rhoades as Reviewed [facility]):  Review User Review Instant Review Result          Preferred Pharmacy:   [address] [phone] Lelo Cruz [phone]  [address] [facility] [address]  Phone: [phone] Fax: [phone]

## 2022-05-28 PROBLEM — R05.9 COUGH: Status: RESOLVED | Noted: 2022-04-28 | Resolved: 2022-05-28

## 2022-05-30 RX ORDER — BUPROPION HYDROCHLORIDE 150 MG/1
150 TABLET ORAL EVERY MORNING
Qty: 30 TABLET | Refills: 0 | Status: SHIPPED | OUTPATIENT
Start: 2022-05-30 | End: 2022-06-06

## 2022-06-06 ENCOUNTER — OFFICE VISIT (OUTPATIENT)
Dept: FAMILY MEDICINE CLINIC | Age: 27
End: 2022-06-06
Payer: COMMERCIAL

## 2022-06-06 VITALS
TEMPERATURE: 97.4 F | OXYGEN SATURATION: 98 % | HEART RATE: 92 BPM | WEIGHT: 279 LBS | SYSTOLIC BLOOD PRESSURE: 134 MMHG | BODY MASS INDEX: 44.36 KG/M2 | DIASTOLIC BLOOD PRESSURE: 72 MMHG

## 2022-06-06 DIAGNOSIS — F33.1 MODERATE EPISODE OF RECURRENT MAJOR DEPRESSIVE DISORDER (HCC): Primary | ICD-10-CM

## 2022-06-06 PROBLEM — F10.930 ALCOHOL WITHDRAWAL SYNDROME WITHOUT COMPLICATION (HCC): Status: ACTIVE | Noted: 2022-06-06

## 2022-06-06 PROCEDURE — 99213 OFFICE O/P EST LOW 20 MIN: CPT | Performed by: NURSE PRACTITIONER

## 2022-06-06 RX ORDER — BUPROPION HYDROCHLORIDE 150 MG/1
300 TABLET ORAL EVERY MORNING
Qty: 30 TABLET | Refills: 0
Start: 2022-06-06 | End: 2022-07-27 | Stop reason: ALTCHOICE

## 2022-06-06 NOTE — PROGRESS NOTES
Louis Berger  : 1995  Encounter date: 2022    This sue 24 y.o. female who presents with  Chief Complaint   Patient presents with    Medication Refill     new med not working well       History of present illness:    HPI Pt is 24year old female for medication recheck on wellbutrin and vistaril, started 2 months ago. Pt with concerns for alcohol abuse, depression and anxiety. Labs initially completed, NL. Pt reports not having any positive affects from wellbutrin, reports very emotional in beginning but resolved. Reports being more social lately, going to concerts and festivals. Pt reports sister is currently taking lexapro. Pt reports continues to drink ETOH daily if not every other day, has stopped liquor and has started seltzer beverages, reports it helps her socially, as a crutch. Eventually would like to wean off completely. Current Outpatient Medications on File Prior to Visit   Medication Sig Dispense Refill    acyclovir (ZOVIRAX) 800 MG tablet TAKE 1 TABLET BY MOUTH TWICE A DAY ( EVERY 12 HOURS ) FOR 5 DAYS AT THE OUTBREAK OF SYMPTOMS      fluticasone-salmeterol (ADVAIR HFA) 115-21 MCG/ACT inhaler Inhale 2 puffs into the lungs 2 times daily 2 each 1    albuterol sulfate HFA (PROVENTIL HFA) 108 (90 Base) MCG/ACT inhaler Inhale 2 puffs into the lungs every 4 hours as needed for Wheezing or Shortness of Breath With spacer (and mask if indicated). Thanks. 2 each 1    ibuprofen (ADVIL;MOTRIN) 200 MG tablet Take 200 mg by mouth every 6 hours as needed for Pain       No current facility-administered medications on file prior to visit. No Known Allergies  Past Medical History:   Diagnosis Date    Asthma       History reviewed. No pertinent surgical history.    Family History   Problem Relation Age of Onset    Asthma Sister     Immune Disorder Sister     Mental Illness Sister     Obesity Sister     Asthma Father     Mental Illness Father     Obesity Father     Breast Cancer Maternal Grandmother     Diabetes Maternal Grandmother     Obesity Maternal Grandmother     Diabetes Paternal Grandfather     Mental Illness Mother     Obesity Mother     Substance Abuse Mother     Substance Abuse Maternal Aunt       Social History     Tobacco Use    Smoking status: Former Smoker     Packs/day: 0.50     Years: 8.00     Pack years: 4.00     Types: Cigarettes    Smokeless tobacco: Never Used   Substance Use Topics    Alcohol use: Yes     Alcohol/week: 0.0 standard drinks        Review of Systems    Objective:    /72 (Site: Left Upper Arm, Position: Sitting, Cuff Size: Large Adult)   Pulse 92   Temp 97.4 °F (36.3 °C) (Infrared)   Wt 279 lb (126.6 kg)   SpO2 98%   BMI 44.36 kg/m²   Weight: 279 lb (126.6 kg)     BP Readings from Last 3 Encounters:   06/06/22 134/72   04/28/22 122/88   02/23/21 123/79     Wt Readings from Last 3 Encounters:   06/06/22 279 lb (126.6 kg)   04/28/22 275 lb (124.7 kg)   02/23/21 260 lb (117.9 kg)     BMI Readings from Last 3 Encounters:   06/06/22 44.36 kg/m²   04/28/22 43.72 kg/m²   02/23/21 41.65 kg/m²       Physical Exam  Vitals reviewed. Constitutional:       Appearance: Normal appearance. She is well-developed. Cardiovascular:      Rate and Rhythm: Normal rate and regular rhythm. Pulses: Normal pulses. Heart sounds: Normal heart sounds. No murmur heard. Pulmonary:      Effort: Pulmonary effort is normal.      Breath sounds: Normal breath sounds. Skin:     General: Skin is warm and dry. Neurological:      Mental Status: She is alert and oriented to person, place, and time. Psychiatric:         Mood and Affect: Mood normal.         Behavior: Behavior normal.         Thought Content: Thought content normal.         Judgment: Judgment normal.         Assessment/Plan    1.  Moderate episode of recurrent major depressive disorder (HCC)  Discussed Gene Site Testing  Will do trial 2 week of increased dosage  Advised pt to reach out and report effects.  - buPROPion (WELLBUTRIN XL) 150 MG extended release tablet; Take 2 tablets by mouth every morning  Dispense: 30 tablet; Refill: 0      Return in about 1 month (around 7/6/2022) for medication re-check. This dictation was generated by voice recognition computer software. Although all attempts are made to edit the dictation for accuracy, there may be errors in the transcription that are not intended.

## 2022-07-13 ENCOUNTER — TELEPHONE (OUTPATIENT)
Dept: FAMILY MEDICINE CLINIC | Age: 27
End: 2022-07-13

## 2022-07-13 NOTE — TELEPHONE ENCOUNTER
----- Message from Juan Pablo Rich sent at 7/13/2022 10:26 AM EDT -----  Subject: Message to Provider    QUESTIONS  Information for Provider? pt was retuning a phone call to reschedule her   appt.  called the office, but after 2 rings would be answered and hung up   on.   ---------------------------------------------------------------------------  --------------  [id number]  [phone]; OK to leave message on voicemail  ---------------------------------------------------------------------------  --------------  SCRIPT ANSWERS  undefined

## 2022-07-27 ENCOUNTER — OFFICE VISIT (OUTPATIENT)
Dept: FAMILY MEDICINE CLINIC | Age: 27
End: 2022-07-27
Payer: COMMERCIAL

## 2022-07-27 VITALS
SYSTOLIC BLOOD PRESSURE: 118 MMHG | TEMPERATURE: 97.9 F | HEART RATE: 89 BPM | OXYGEN SATURATION: 99 % | DIASTOLIC BLOOD PRESSURE: 78 MMHG

## 2022-07-27 DIAGNOSIS — F33.1 MODERATE EPISODE OF RECURRENT MAJOR DEPRESSIVE DISORDER (HCC): Primary | ICD-10-CM

## 2022-07-27 PROCEDURE — 99213 OFFICE O/P EST LOW 20 MIN: CPT | Performed by: NURSE PRACTITIONER

## 2022-07-27 NOTE — PROGRESS NOTES
Anna Fili  : 1995  Encounter date: 2022    This sue 24 y.o. female who presents with  Chief Complaint   Patient presents with    Anxiety     Wellbutrin not working    Depression     Wellbutrin not working       History of present illness:    HPI PT is 24year old female for follow up on wellbutrin, increased 300 mg last month. Pt reports developed side effects, severe HA, worsened moods. Pt reports continued ETOH use, drinks weekends, has blacked out, has experienced hangovers and vomited. Pt binge drinks. Continues to live at home and works FT. Pt has not tried any other medications. Current Outpatient Medications on File Prior to Visit   Medication Sig Dispense Refill    acyclovir (ZOVIRAX) 800 MG tablet TAKE 1 TABLET BY MOUTH TWICE A DAY ( EVERY 12 HOURS ) FOR 5 DAYS AT THE OUTBREAK OF SYMPTOMS      fluticasone-salmeterol (ADVAIR HFA) 115-21 MCG/ACT inhaler Inhale 2 puffs into the lungs 2 times daily 2 each 1    albuterol sulfate HFA (PROVENTIL HFA) 108 (90 Base) MCG/ACT inhaler Inhale 2 puffs into the lungs every 4 hours as needed for Wheezing or Shortness of Breath With spacer (and mask if indicated). Thanks. 2 each 1    ibuprofen (ADVIL;MOTRIN) 200 MG tablet Take 200 mg by mouth every 6 hours as needed for Pain       No current facility-administered medications on file prior to visit. No Known Allergies  Past Medical History:   Diagnosis Date    Asthma       History reviewed. No pertinent surgical history.    Family History   Problem Relation Age of Onset    Asthma Sister     Immune Disorder Sister     Mental Illness Sister     Obesity Sister     Asthma Father     Mental Illness Father     Obesity Father     Breast Cancer Maternal Grandmother     Diabetes Maternal Grandmother     Obesity Maternal Grandmother     Diabetes Paternal Grandfather     Mental Illness Mother     Obesity Mother     Substance Abuse Mother     Substance Abuse Maternal Aunt       Social History     Tobacco Use Smoking status: Former     Packs/day: 0.50     Years: 8.00     Pack years: 4.00     Types: Cigarettes    Smokeless tobacco: Never   Substance Use Topics    Alcohol use: Yes     Alcohol/week: 0.0 standard drinks        Review of Systems    Objective:    /78 (Site: Right Upper Arm, Position: Sitting, Cuff Size: Large Adult)   Pulse 89   Temp 97.9 °F (36.6 °C) (Infrared)   SpO2 99%         BP Readings from Last 3 Encounters:   07/27/22 118/78   06/06/22 134/72   04/28/22 122/88     Wt Readings from Last 3 Encounters:   06/06/22 279 lb (126.6 kg)   04/28/22 275 lb (124.7 kg)   02/23/21 260 lb (117.9 kg)     BMI Readings from Last 3 Encounters:   06/06/22 44.36 kg/m²   04/28/22 43.72 kg/m²   02/23/21 41.65 kg/m²       Physical Exam  Vitals reviewed. Constitutional:       Appearance: Normal appearance. She is well-developed. Cardiovascular:      Rate and Rhythm: Normal rate and regular rhythm. Pulses: Normal pulses. Heart sounds: Normal heart sounds. No murmur heard. Pulmonary:      Effort: Pulmonary effort is normal.      Breath sounds: Normal breath sounds. Skin:     General: Skin is warm and dry. Neurological:      Mental Status: She is alert and oriented to person, place, and time. Psychiatric:         Behavior: Behavior normal.         Thought Content: Thought content normal.         Judgment: Judgment normal.      Comments: Flat affect       Assessment/Plan    1. Moderate episode of recurrent major depressive disorder ([facility] [address]  Gene Site Completed  Will call with results and give verbal order  Uncontrolled    Return in about 1 month (around 8/27/2022) for medication re-check. This dictation was generated by voice recognition computer software. Although all attempts are made to edit the dictation for accuracy, there may be errors in the transcription that are not intended.

## 2022-08-10 ENCOUNTER — TELEPHONE (OUTPATIENT)
Dept: FAMILY MEDICINE CLINIC | Age: 27
End: 2022-08-10

## 2022-08-10 DIAGNOSIS — F33.1 MODERATE EPISODE OF RECURRENT MAJOR DEPRESSIVE DISORDER (HCC): Primary | ICD-10-CM

## 2022-08-10 RX ORDER — VENLAFAXINE HYDROCHLORIDE 37.5 MG/1
37.5 CAPSULE, EXTENDED RELEASE ORAL DAILY
Qty: 30 CAPSULE | Refills: 0 | Status: SHIPPED | OUTPATIENT
Start: 2022-08-10 | End: 2022-09-12 | Stop reason: ALTCHOICE

## 2022-08-10 NOTE — TELEPHONE ENCOUNTER
Notify patient that gene site testing came back and wellbutrin is on not contraindicated but due to side effects would like to try different medication. Sending over low dose effexor, please schedule follow up in 1 month.

## 2022-08-11 NOTE — TELEPHONE ENCOUNTER
Called Pt, let her know to  new med and stop taking wellbutrin. Scheduled appt for in a month. Stated she understood without further questions.

## 2022-09-12 ENCOUNTER — OFFICE VISIT (OUTPATIENT)
Dept: FAMILY MEDICINE CLINIC | Age: 27
End: 2022-09-12
Payer: COMMERCIAL

## 2022-09-12 VITALS
HEART RATE: 78 BPM | DIASTOLIC BLOOD PRESSURE: 72 MMHG | SYSTOLIC BLOOD PRESSURE: 114 MMHG | OXYGEN SATURATION: 99 % | TEMPERATURE: 97.9 F | WEIGHT: 284 LBS | BODY MASS INDEX: 45.15 KG/M2

## 2022-09-12 DIAGNOSIS — F33.1 MODERATE EPISODE OF RECURRENT MAJOR DEPRESSIVE DISORDER (HCC): Primary | ICD-10-CM

## 2022-09-12 PROCEDURE — 99213 OFFICE O/P EST LOW 20 MIN: CPT | Performed by: NURSE PRACTITIONER

## 2022-09-12 RX ORDER — VENLAFAXINE HYDROCHLORIDE 75 MG/1
75 CAPSULE, EXTENDED RELEASE ORAL DAILY
Qty: 30 CAPSULE | Refills: 5 | Status: SHIPPED | OUTPATIENT
Start: 2022-09-12 | End: 2022-10-31 | Stop reason: DRUGHIGH

## 2022-09-12 NOTE — PROGRESS NOTES
Yanick Presley  : 1995  Encounter date: 2022    This sue 24 y.o. female who presents with  Chief Complaint   Patient presents with    Follow-up     Not seeing much of a difference, wants higher dose, no side effects       History of present illness:    HPI Pt is 24year old female for medication check on effexor 37.5 mg.  Reviewed Gene Site Testing results. Pt denies side effects of medication, reports sister has taken medication with good results at higher dose. Pt reported started to feel better but tapered off and less effective. No other concerns. Current Outpatient Medications on File Prior to Visit   Medication Sig Dispense Refill    acyclovir (ZOVIRAX) 800 MG tablet TAKE 1 TABLET BY MOUTH TWICE A DAY ( EVERY 12 HOURS ) FOR 5 DAYS AT THE OUTBREAK OF SYMPTOMS      fluticasone-salmeterol (ADVAIR HFA) 115-21 MCG/ACT inhaler Inhale 2 puffs into the lungs 2 times daily 2 each 1    albuterol sulfate HFA (PROVENTIL HFA) 108 (90 Base) MCG/ACT inhaler Inhale 2 puffs into the lungs every 4 hours as needed for Wheezing or Shortness of Breath With spacer (and mask if indicated). Thanks. 2 each 1    ibuprofen (ADVIL;MOTRIN) 200 MG tablet Take 200 mg by mouth every 6 hours as needed for Pain       No current facility-administered medications on file prior to visit. No Known Allergies  Past Medical History:   Diagnosis Date    Asthma       No past surgical history on file.    Family History   Problem Relation Age of Onset    Asthma Sister     Immune Disorder Sister     Mental Illness Sister     Obesity Sister     Asthma Father     Mental Illness Father     Obesity Father     Breast Cancer Maternal Grandmother     Diabetes Maternal Grandmother     Obesity Maternal Grandmother     Diabetes Paternal Grandfather     Mental Illness Mother     Obesity Mother     Substance Abuse Mother     Substance Abuse Maternal Aunt       Social History     Tobacco Use    Smoking status: Former     Packs/day: 0.50     Years: 8.00     Pack years: 4.00     Types: Cigarettes    Smokeless tobacco: Never   Substance Use Topics    Alcohol use: Yes     Alcohol/week: 0.0 standard drinks        Review of Systems    Objective:    /72 (Site: Left Upper Arm, Position: Sitting, Cuff Size: Medium Adult)   Pulse 78   Temp 97.9 °F (36.6 °C) (Infrared)   Wt 284 lb (128.8 kg)   SpO2 99%   BMI 45.15 kg/m²   Weight: 284 lb (128.8 kg)     BP Readings from Last 3 Encounters:   09/12/22 114/72   07/27/22 118/78   06/06/22 134/72     Wt Readings from Last 3 Encounters:   09/12/22 284 lb (128.8 kg)   06/06/22 279 lb (126.6 kg)   04/28/22 275 lb (124.7 kg)     BMI Readings from Last 3 Encounters:   09/12/22 45.15 kg/m²   06/06/22 44.36 kg/m²   04/28/22 43.72 kg/m²       Physical Exam  Vitals reviewed. Constitutional:       Appearance: Normal appearance. She is well-developed. Cardiovascular:      Rate and Rhythm: Normal rate and regular rhythm. Pulses: Normal pulses. Heart sounds: Normal heart sounds. No murmur heard. Pulmonary:      Effort: Pulmonary effort is normal.      Breath sounds: Normal breath sounds. Skin:     General: Skin is warm and dry. Neurological:      Mental Status: She is alert and oriented to person, place, and time. Psychiatric:         Mood and Affect: Mood normal.         Behavior: Behavior normal.         Thought Content: Thought content normal.         Judgment: Judgment normal.       Assessment/Plan    1. Moderate episode of recurrent major depressive disorder (HCC)  Uncontrolled  Advised self care  Talking therapy  - venlafaxine (EFFEXOR XR) 75 MG extended release capsule; Take 1 capsule by mouth daily  Dispense: 30 capsule; Refill: 5    Return in about 6 months (around 3/12/2023) for medication re-check. This dictation was generated by voice recognition computer software.   Although all attempts are made to edit the dictation for accuracy, there may be errors in the transcription that are not intended.

## 2022-09-20 ENCOUNTER — TELEPHONE (OUTPATIENT)
Dept: FAMILY MEDICINE CLINIC | Age: 27
End: 2022-09-20

## 2022-09-20 NOTE — TELEPHONE ENCOUNTER
Luisa Sommers with [facility] calling to advise Haylie Pardo that patient has been admitted to their facility on 9/19. They will fax over any papers as needed.  No call back needed    FYI

## 2022-09-28 DIAGNOSIS — J45.41 MODERATE PERSISTENT ASTHMA WITH ACUTE EXACERBATION: ICD-10-CM

## 2022-10-05 ENCOUNTER — OFFICE VISIT (OUTPATIENT)
Dept: FAMILY MEDICINE CLINIC | Age: 27
End: 2022-10-05
Payer: COMMERCIAL

## 2022-10-05 VITALS — HEART RATE: 96 BPM | TEMPERATURE: 99.3 F | OXYGEN SATURATION: 96 %

## 2022-10-05 DIAGNOSIS — J45.41 MODERATE PERSISTENT ASTHMA WITH ACUTE EXACERBATION: ICD-10-CM

## 2022-10-05 DIAGNOSIS — J06.9 VIRAL URI: Primary | ICD-10-CM

## 2022-10-05 PROCEDURE — 99214 OFFICE O/P EST MOD 30 MIN: CPT | Performed by: NURSE PRACTITIONER

## 2022-10-05 RX ORDER — PREDNISONE 20 MG/1
TABLET ORAL
Qty: 18 TABLET | Refills: 0 | Status: SHIPPED | OUTPATIENT
Start: 2022-10-05 | End: 2022-10-31 | Stop reason: ALTCHOICE

## 2022-10-05 ASSESSMENT — ENCOUNTER SYMPTOMS
COUGH: 1
SHORTNESS OF BREATH: 1
WHEEZING: 1
VOMITING: 0
DIARRHEA: 0
NAUSEA: 0

## 2022-10-05 NOTE — PROGRESS NOTES
Gian Fernandez  : 1995  Encounter date: 10/5/2022    This is a 24 y.o. female who presents with  Chief Complaint   Patient presents with    Cough     History of present illness:    HPI   Presents to clinic today with concerns for sinus congestion/drainage along with soreness/cough that started yesterday. Denies any fevers. Reports some muscle aches/chills. Reports having discolored mucous. Does have asthma - wheezing has been worsening - did have to do a breathing treatment at home with minimal relief. Did have covid on 2022 - reports had complete recovery. No Known Allergies  Current Outpatient Medications   Medication Sig Dispense Refill    predniSONE (DELTASONE) 20 MG tablet Take 3 tabs for 3 days, 2 tabs for 3 days and 1 tab for 3 days 18 tablet 0    fluticasone-salmeterol (ADVAIR HFA) 115-21 MCG/ACT inhaler Inhale 2 puffs into the lungs 2 times daily 2 each 1    venlafaxine (EFFEXOR XR) 75 MG extended release capsule Take 1 capsule by mouth daily 30 capsule 5    acyclovir (ZOVIRAX) 800 MG tablet TAKE 1 TABLET BY MOUTH TWICE A DAY ( EVERY 12 HOURS ) FOR 5 DAYS AT THE OUTBREAK OF SYMPTOMS      albuterol sulfate HFA (PROVENTIL HFA) 108 (90 Base) MCG/ACT inhaler Inhale 2 puffs into the lungs every 4 hours as needed for Wheezing or Shortness of Breath With spacer (and mask if indicated). Thanks. 2 each 1    ibuprofen (ADVIL;MOTRIN) 200 MG tablet Take 200 mg by mouth every 6 hours as needed for Pain       No current facility-administered medications for this visit. Review of Systems   Constitutional:  Negative for activity change, appetite change, chills, fatigue and fever. Respiratory:  Positive for cough, shortness of breath and wheezing. Cardiovascular:  Negative for chest pain and palpitations. Gastrointestinal:  Negative for diarrhea, nausea and vomiting. Past medical, surgical, family and social history were reviewed and updated with the patient.     Objective:    Pulse 96 Temp 99.3 °F (37.4 °C) (Tympanic)   SpO2 96%         BP Readings from Last 3 Encounters:   09/12/22 114/72   07/27/22 118/78   06/06/22 134/72     Wt Readings from Last 3 Encounters:   09/12/22 284 lb (128.8 kg)   06/06/22 279 lb (126.6 kg)   04/28/22 275 lb (124.7 kg)     Physical Exam  Constitutional:       General: She is not in acute distress. Appearance: She is well-developed. HENT:      Head: Normocephalic and atraumatic. Right Ear: Tympanic membrane, ear canal and external ear normal.      Left Ear: Tympanic membrane, ear canal and external ear normal.      Nose: Nose normal. No mucosal edema or rhinorrhea. Right Sinus: No maxillary sinus tenderness or frontal sinus tenderness. Left Sinus: No maxillary sinus tenderness or frontal sinus tenderness. Mouth/Throat:      Pharynx: Uvula midline. Tonsils: No tonsillar exudate. 1+ on the right. 1+ on the left. Eyes:      General:         Right eye: No discharge. Left eye: No discharge. Conjunctiva/sclera: Conjunctivae normal.      Pupils: Pupils are equal, round, and reactive to light. Cardiovascular:      Rate and Rhythm: Normal rate and regular rhythm. Heart sounds: Normal heart sounds, S1 normal and S2 normal.   Pulmonary:      Effort: Pulmonary effort is normal. No respiratory distress. Breath sounds: Wheezing (throughout) present. Comments: Congested cough. Musculoskeletal:      Cervical back: Normal range of motion. Lymphadenopathy:      Cervical: No cervical adenopathy. Skin:     General: Skin is warm and dry. Neurological:      Mental Status: She is alert and oriented to person, place, and time. Psychiatric:         Thought Content: Thought content normal.         Judgment: Judgment normal.       Assessment/Plan    1.  Viral URI  Most likely viral.  -Saline rinse/Saline spray 3-4 times daily  -Salt water gargles 3-4 times daily  -May use Ibuprofen or Tylenol for discomfort  -May add in a 24 hour antihistamine to assist with excess sinus drainage/post-nasal drip  -If symptoms do not resolve or worsen in the next 48-72 hours follow-up with office    2. Moderate persistent asthma with acute exacerbation  Initiate prednisone. Use home nebulizer. If no improvement will send for chest xray tomorrow. If symptoms persist with discolored sputum next week call office and I can start on antibiotic. Given note to return to work for Friday, if needed can extend to Monday - patient to call. - predniSONE (DELTASONE) 20 MG tablet; Take 3 tabs for 3 days, 2 tabs for 3 days and 1 tab for 3 days  Dispense: 18 tablet; Refill: 0     Vik Sox was counseled regarding symptoms of current diagnosis, course and complications of disease if inadequately treated. Discussed side effects of medications, diagnosis, treatment options, and prognosis along with risks, benefits, complications, and alternatives of treatment including labs, imaging and other studies/treatment targets and goals. She verbalized understanding of instructions and counseling. Return if symptoms worsen or fail to improve. Medical decision making of moderate complexity.

## 2022-10-06 ENCOUNTER — TELEPHONE (OUTPATIENT)
Dept: FAMILY MEDICINE CLINIC | Age: 27
End: 2022-10-06

## 2022-10-06 DIAGNOSIS — J20.9 ACUTE BRONCHITIS, UNSPECIFIED ORGANISM: Primary | ICD-10-CM

## 2022-10-06 RX ORDER — AZITHROMYCIN 250 MG/1
250 TABLET, FILM COATED ORAL SEE ADMIN INSTRUCTIONS
Qty: 6 TABLET | Refills: 0 | Status: SHIPPED | OUTPATIENT
Start: 2022-10-06 | End: 2022-10-11

## 2022-10-06 NOTE — TELEPHONE ENCOUNTER
Pt called stating she was in for an appointment yesterday for asthma and chest congestion. Pt was prescribed steroids, still coughing up mucus and she thinks she has an infection. Pt would like a call back. Please advise.

## 2022-10-06 NOTE — TELEPHONE ENCOUNTER
Called Pt, gave information. Stated her friend will help her to  ABX and mucinex. Had no further questions.

## 2022-10-13 DIAGNOSIS — F32.A DEPRESSION, UNSPECIFIED DEPRESSION TYPE: ICD-10-CM

## 2022-10-13 DIAGNOSIS — F10.930 ALCOHOL WITHDRAWAL SYNDROME WITHOUT COMPLICATION (HCC): ICD-10-CM

## 2022-10-13 RX ORDER — HYDROXYZINE PAMOATE 25 MG/1
CAPSULE ORAL
Qty: 60 CAPSULE | Refills: 0 | Status: SHIPPED | OUTPATIENT
Start: 2022-10-13

## 2022-10-31 ENCOUNTER — OFFICE VISIT (OUTPATIENT)
Dept: FAMILY MEDICINE CLINIC | Age: 27
End: 2022-10-31
Payer: COMMERCIAL

## 2022-10-31 VITALS
HEART RATE: 89 BPM | SYSTOLIC BLOOD PRESSURE: 117 MMHG | DIASTOLIC BLOOD PRESSURE: 82 MMHG | BODY MASS INDEX: 44.99 KG/M2 | WEIGHT: 283 LBS | TEMPERATURE: 97.3 F

## 2022-10-31 DIAGNOSIS — F10.10 ALCOHOL ABUSE: ICD-10-CM

## 2022-10-31 DIAGNOSIS — F19.10 SUBSTANCE ABUSE (HCC): ICD-10-CM

## 2022-10-31 DIAGNOSIS — F33.1 MODERATE EPISODE OF RECURRENT MAJOR DEPRESSIVE DISORDER (HCC): Primary | ICD-10-CM

## 2022-10-31 PROCEDURE — 99214 OFFICE O/P EST MOD 30 MIN: CPT | Performed by: NURSE PRACTITIONER

## 2022-10-31 RX ORDER — VENLAFAXINE HYDROCHLORIDE 75 MG/1
75 CAPSULE, EXTENDED RELEASE ORAL DAILY
Qty: 30 CAPSULE | Refills: 5 | Status: CANCELLED | OUTPATIENT
Start: 2022-10-31

## 2022-10-31 RX ORDER — ZIPRASIDONE HYDROCHLORIDE 20 MG/1
CAPSULE ORAL
COMMUNITY
Start: 2022-09-27 | End: 2022-10-31 | Stop reason: SDUPTHER

## 2022-10-31 RX ORDER — ZIPRASIDONE HYDROCHLORIDE 20 MG/1
CAPSULE ORAL
Qty: 180 CAPSULE | Refills: 0 | Status: SHIPPED | OUTPATIENT
Start: 2022-10-31 | End: 2022-10-31

## 2022-10-31 RX ORDER — ZIPRASIDONE HYDROCHLORIDE 20 MG/1
CAPSULE ORAL
Qty: 180 CAPSULE | Refills: 0 | Status: SHIPPED | OUTPATIENT
Start: 2022-10-31

## 2022-10-31 RX ORDER — HYDROXYZINE 50 MG/1
50 TABLET, FILM COATED ORAL EVERY 8 HOURS PRN
Qty: 90 TABLET | Refills: 0 | Status: SHIPPED | OUTPATIENT
Start: 2022-10-31 | End: 2022-11-10

## 2022-10-31 RX ORDER — HYDROXYZINE 50 MG/1
50 TABLET, FILM COATED ORAL EVERY 8 HOURS PRN
Qty: 90 TABLET | Refills: 0 | Status: SHIPPED | OUTPATIENT
Start: 2022-10-31 | End: 2022-10-31

## 2022-10-31 RX ORDER — VENLAFAXINE HYDROCHLORIDE 150 MG/1
150 CAPSULE, EXTENDED RELEASE ORAL DAILY
Qty: 90 CAPSULE | Refills: 0 | Status: SHIPPED | OUTPATIENT
Start: 2022-10-31

## 2022-10-31 RX ORDER — VENLAFAXINE HYDROCHLORIDE 150 MG/1
150 CAPSULE, EXTENDED RELEASE ORAL DAILY
Qty: 90 CAPSULE | Refills: 0 | Status: SHIPPED | OUTPATIENT
Start: 2022-10-31 | End: 2022-10-31

## 2022-10-31 ASSESSMENT — ENCOUNTER SYMPTOMS
SHORTNESS OF BREATH: 0
COUGH: 0
VOMITING: 0
NAUSEA: 0
DIARRHEA: 0

## 2022-10-31 NOTE — PROGRESS NOTES
Jairo Peña  : 1995  Encounter date: 10/31/2022    This is a 24 y.o. female who presents with  Chief Complaint   Patient presents with    Follow-Up from [facility] discharged 2022  Patient requesting referral to psychiatrist      History of present illness:    HPI   Presents to clinic today for hospital discharge - follow up from an inpatient stay at [facility] from - for alcohol/benzo use along with depression. Was taken there by her sister after a psychotic episode while on multiple substances. While she was there was helpful. Was recommended for partial hospitalization and was unable to do because of work schedule. Has not done any follow up in regards to counseling/psychiatry or alcohol treatment - no sponsor. Since hospital discharge has drank a few times at a concert and then again at a wedding - had a few drinks. Has not used any drugs since discharge. Does have a sister that is her best friend - she doesn't use any drugs and little alcohol use. Not doing any substance abuse counseling. Would like referral for psychiatry. Feels about 40-50% back to baseline. Lives at home with mom - safe living situation.   Has been able to maintain her job at [employer] in [place] - enjoys job in general.     No Known Allergies  Current Outpatient Medications   Medication Sig Dispense Refill    hydrOXYzine HCl (ATARAX) 50 MG tablet Take 1 tablet by mouth every 8 hours as needed for Anxiety 90 tablet 0    venlafaxine (EFFEXOR XR) 150 MG extended release capsule Take 1 capsule by mouth daily 90 capsule 0    ziprasidone (GEODON) 20 MG capsule TAKE 1 CAPSULE BY MOUTH TWICE DAILY FOR MOOD 180 capsule 0    hydrOXYzine pamoate (VISTARIL) 25 MG capsule TAKE 1 CAPSULE BY MOUTH TWICE DAILY AS NEEDED FOR ANXIETY 60 capsule 0    fluticasone-salmeterol (ADVAIR HFA) 115-21 MCG/ACT inhaler Inhale 2 puffs into the lungs 2 times daily 2 each 1    acyclovir (ZOVIRAX) 800 MG tablet TAKE 1 TABLET BY MOUTH TWICE A DAY ( EVERY 12 HOURS ) FOR 5 DAYS AT THE OUTBREAK OF SYMPTOMS      ibuprofen (ADVIL;MOTRIN) 200 MG tablet Take 200 mg by mouth every 6 hours as needed for Pain      albuterol sulfate HFA (PROVENTIL HFA) 108 (90 Base) MCG/ACT inhaler Inhale 2 puffs into the lungs every 4 hours as needed for Wheezing or Shortness of Breath With spacer (and mask if indicated). Thanks. 2 each 1     No current facility-administered medications for this visit. Review of Systems   Constitutional:  Negative for activity change, appetite change, chills, fatigue and fever. Respiratory:  Negative for cough and shortness of breath. Cardiovascular:  Negative for chest pain and palpitations. Gastrointestinal:  Negative for diarrhea, nausea and vomiting. Past medical, surgical, family and social history were reviewed and updated with the patient. Objective:    /82   Pulse 89   Temp 97.3 °F (36.3 °C)   Wt 283 lb (128.4 kg)   BMI 44.99 kg/m²   Weight: 283 lb (128.4 kg)     BP Readings from Last 3 Encounters:   10/31/22 117/82   09/12/22 114/72   07/27/22 118/78     Wt Readings from Last 3 Encounters:   10/31/22 283 lb (128.4 kg)   09/12/22 284 lb (128.8 kg)   06/06/22 279 lb (126.6 kg)     Physical Exam  Constitutional:       General: She is not in acute distress. Appearance: She is well-developed. HENT:      Head: Normocephalic and atraumatic. Cardiovascular:      Rate and Rhythm: Normal rate and regular rhythm. Heart sounds: Normal heart sounds, S1 normal and S2 normal.   Pulmonary:      Effort: Pulmonary effort is normal. No respiratory distress. Breath sounds: Normal breath sounds. Skin:     General: Skin is warm and dry. Neurological:      Mental Status: She is alert and oriented to person, place, and time. Psychiatric:         Thought Content: Thought content normal.         Judgment: Judgment normal.     Assessment/Plan    1.  Moderate episode of recurrent major depressive disorder ([facility].)  Refilled medications. Improving. Given resources on AVS for follow up. Follow up with me or Gary Hylton in 4 weeks for continued monitoring.   - hydrOXYzine HCl (ATARAX) 50 MG tablet; Take 1 tablet by mouth every 8 hours as needed for Anxiety  Dispense: 90 tablet; Refill: 0  - venlafaxine (EFFEXOR XR) 150 MG extended release capsule; Take 1 capsule by mouth daily  Dispense: 90 capsule; Refill: 0  - ziprasidone (GEODON) 20 MG capsule; TAKE 1 CAPSULE BY MOUTH TWICE DAILY FOR MOOD  Dispense: 180 capsule; Refill: 0    2. Substance abuse ([facility].)  Encouraged AA or a similar program to assist.  Work with a sponsor. 3. Alcohol abuse    Angel Luis Petty was counseled regarding symptoms of current diagnosis, course and complications of disease if inadequately treated. Discussed side effects of medications, diagnosis, treatment options, and prognosis along with risks, benefits, complications, and alternatives of treatment including labs, imaging and other studies/treatment targets and goals. She verbalized understanding of instructions and counseling. Return in about 4 weeks (around 11/28/2022) for mental health. Medical decision making of moderate complexity.

## 2022-10-31 NOTE — PATIENT INSTRUCTIONS
--Psychiatry/Psychology/Counseling    [facility] Consultation and Crisis Team [phone] 3[address] [facility] Team (Suicide)  [phone]    Psychology Today  Resource to find providers  Www. psychologytoday. [facility]  [phone]    [address] [facility] [address]   [facility], [address]   Phone: [phone]    Fax: [phone]     [address]  [phone]    [facility]  Laurie BARNEY [address], [address] [place], [place]   [phone] [address], MD  [address]  [phone]    [facility]  Dr. Rosie Tovar MD  [facility] 73. [place], [place]   [phone]    19 [facility] Richard Khalil, [address] [facility] [address]   [phone]    MD Dr. Shaun Alas Dr., MD Arlyce Sleet, MD  [address] [facility],   [place], [address] [place] [address]   [phone]      Azra Crandall, PhD  Layla Theodroe, PhD  [address] [facility]  [phone]      [facility], [address] [facility], [address] [place] [address]  [phone]    Dr. Selvin Veras  [facility], [place]  [phone]    Dr. Lisa White MD  [address] [facility].  Leann [address]. [address]  [phone]    Dr. Armond Cloud MD  [facility] [address]72.  [address]and [address]    Austin Sanabria MD   [phone]    [facility]  [address] [facility] [address] [facility], [facility] 25.  [facility], [address] [place] [phone], [address]  [place], [address]  [phone]    Dr. Suman Motta [facility]  [address] [place] [address] Drive., [facility]. [address]  P. [phone]    [address] [facility] [address] [place] [address]  P. [phone]    [address]  (280) [facility]  [phone] [address] [facility] [address], [address] [facility] 20. [place], [facility] [employer]  Phone [phone], Fax [phone]    [address]  [phone]    Substance Abuse    [facility] 20. [place] [address] [place] [employer]  Phone  [phone]  Walk-in treatment assessments Monday- Friday 8AM-2PM    [place] [address].   [place] [address] [address] [employer]  Phone [phone]    [address]  [address]    [facility] Andres Begum [facility]  [phone]    [facility]  [phone]    [facility]  [address] [facility]  [phone]

## 2022-10-31 NOTE — LETTER
[address] [facility] [address]  Phone: [phone]  Fax: [phone] PETE Hdz CNP        October 31, 2022     Patient: Bruno Nolasco   YOB: 1995   Date of Visit: 10/31/2022       To Whom It May Concern: It is my medical opinion that Bruno Nolasco may return to work on 11/1/22. If you have any questions or concerns, please don't hesitate to call.     Sincerely,        PETE Dee CNP

## 2023-01-13 ENCOUNTER — TELEPHONE (OUTPATIENT)
Dept: ADMINISTRATIVE | Age: 28
End: 2023-01-13

## 2023-01-13 NOTE — TELEPHONE ENCOUNTER
Submitted PA for ADVAIR  [facility] Key: [id number] STATUS: APPROVED. This request has been approved using information available on the patient's profile. [id number]; Review Type:Prior Auth; Coverage Start Date:12/31/2022; Coverage End Date:01/30/2024;

## 2023-02-03 DIAGNOSIS — F33.1 MODERATE EPISODE OF RECURRENT MAJOR DEPRESSIVE DISORDER (HCC): ICD-10-CM

## 2023-02-06 RX ORDER — ZIPRASIDONE HYDROCHLORIDE 20 MG/1
CAPSULE ORAL
Qty: 180 CAPSULE | Refills: 0 | OUTPATIENT
Start: 2023-02-06

## 2023-02-06 RX ORDER — VENLAFAXINE HYDROCHLORIDE 150 MG/1
150 CAPSULE, EXTENDED RELEASE ORAL DAILY
Qty: 90 CAPSULE | Refills: 0 | OUTPATIENT
Start: 2023-02-06

## 2023-02-06 NOTE — TELEPHONE ENCOUNTER
Medication:   Requested Prescriptions     Pending Prescriptions Disp Refills    venlafaxine (EFFEXOR XR) 150 MG extended release capsule 90 capsule 0     Sig: Take 1 capsule by mouth daily    ziprasidone (GEODON) 20 MG capsule 180 capsule 0     Sig: TAKE 1 CAPSULE BY MOUTH TWICE DAILY FOR MOOD      Last Filled:      Patient Phone Number: [phone] (home)     Last appt: 10/31/2022   Next appt: Visit date not found    Last OARRS: No flowsheet data found.   PDMP Monitoring:    Last PDMP Guillermina Ramos as Reviewed [facility]):  Review User Review Instant Review Result          Preferred Pharmacy: [facility]

## 2023-02-13 ENCOUNTER — OFFICE VISIT (OUTPATIENT)
Dept: FAMILY MEDICINE CLINIC | Age: 28
End: 2023-02-13
Payer: COMMERCIAL

## 2023-02-13 VITALS
BODY MASS INDEX: 48.81 KG/M2 | HEART RATE: 83 BPM | DIASTOLIC BLOOD PRESSURE: 71 MMHG | SYSTOLIC BLOOD PRESSURE: 123 MMHG | WEIGHT: 293 LBS | TEMPERATURE: 97.7 F

## 2023-02-13 DIAGNOSIS — N60.89 SEBACEOUS CYST OF SKIN OF BREAST, UNSPECIFIED LATERALITY: Primary | ICD-10-CM

## 2023-02-13 DIAGNOSIS — F33.1 MODERATE EPISODE OF RECURRENT MAJOR DEPRESSIVE DISORDER (HCC): ICD-10-CM

## 2023-02-13 PROCEDURE — 99214 OFFICE O/P EST MOD 30 MIN: CPT | Performed by: NURSE PRACTITIONER

## 2023-02-13 RX ORDER — DOXYCYCLINE HYCLATE 100 MG
100 TABLET ORAL 2 TIMES DAILY
Qty: 20 TABLET | Refills: 0 | Status: SHIPPED | OUTPATIENT
Start: 2023-02-13 | End: 2023-02-23

## 2023-02-13 RX ORDER — DESVENLAFAXINE 50 MG/1
50 TABLET, EXTENDED RELEASE ORAL DAILY
Qty: 30 TABLET | Refills: 3 | Status: SHIPPED | OUTPATIENT
Start: 2023-02-13

## 2023-02-13 RX ORDER — VENLAFAXINE HYDROCHLORIDE 150 MG/1
150 CAPSULE, EXTENDED RELEASE ORAL DAILY
Qty: 90 CAPSULE | Refills: 1 | Status: CANCELLED | OUTPATIENT
Start: 2023-02-13

## 2023-02-13 NOTE — PROGRESS NOTES
Naty Mcrae  : 1995  Encounter date: 2023    This sue 24 y.o. female who presents with  Chief Complaint   Patient presents with    Follow-up     Needing medication refills. Would like to discuss the Geodon and Vistaril- weight gain and not helping anxiety. History of present illness:    HPI Pt is 24year old female for medication check. Previous Gene Site testing completed and reviewed. Pt taking high dose Effexor and started on Geodon while in [place]. Pt reports 25 lb weight gain attributed to medication. Pt reports not taking Effexor in 3 days being out of medication. Pt reports continued depressive symptoms, tearful. Denies SI or self harm. Pt also concerned about reoccurring cyst on chest, reports drainage and tenderness. Current Outpatient Medications on File Prior to Visit   Medication Sig Dispense Refill    venlafaxine (EFFEXOR XR) 150 MG extended release capsule Take 1 capsule by mouth daily 90 capsule 0    fluticasone-salmeterol (ADVAIR HFA) 115-21 MCG/ACT inhaler Inhale 2 puffs into the lungs 2 times daily 2 each 1    acyclovir (ZOVIRAX) 800 MG tablet TAKE 1 TABLET BY MOUTH TWICE A DAY ( EVERY 12 HOURS ) FOR 5 DAYS AT THE OUTBREAK OF SYMPTOMS      ibuprofen (ADVIL;MOTRIN) 200 MG tablet Take 200 mg by mouth every 6 hours as needed for Pain      albuterol sulfate HFA (PROVENTIL HFA) 108 (90 Base) MCG/ACT inhaler Inhale 2 puffs into the lungs every 4 hours as needed for Wheezing or Shortness of Breath With spacer (and mask if indicated). Thanks. 2 each 1     No current facility-administered medications on file prior to visit. No Known Allergies  Past Medical History:   Diagnosis Date    Asthma       No past surgical history on file.    Family History   Problem Relation Age of Onset    Asthma Sister     Immune Disorder Sister     Mental Illness Sister     Obesity Sister     Asthma Father     Mental Illness Father     Obesity Father     Breast Cancer Maternal Grandmother     Diabetes Maternal Grandmother     Obesity Maternal Grandmother     Diabetes Paternal Grandfather     Mental Illness Mother     Obesity Mother     Substance Abuse Mother     Substance Abuse Maternal Aunt       Social History     Tobacco Use    Smoking status: Former     Packs/day: 0.50     Years: 8.00     Pack years: 4.00     Types: Cigarettes    Smokeless tobacco: Never   Substance Use Topics    Alcohol use: Yes     Alcohol/week: 0.0 standard drinks        Review of Systems    Objective:    /71   Pulse 83   Temp 97.7 °F (36.5 °C)   Wt (!) 307 lb (139.3 kg)   BMI 48.81 kg/m²   Weight: (!) 307 lb (139.3 kg)     BP Readings from Last 3 Encounters:   02/13/23 123/71   10/31/22 117/82   09/12/22 114/72     Wt Readings from Last 3 Encounters:   02/13/23 (!) 307 lb (139.3 kg)   10/31/22 283 lb (128.4 kg)   09/12/22 284 lb (128.8 kg)     BMI Readings from Last 3 Encounters:   02/13/23 48.81 kg/m²   10/31/22 44.99 kg/m²   09/12/22 45.15 kg/m²       Physical Exam  Vitals reviewed.   Constitutional:       Appearance: Normal appearance. She is well-developed. She is obese.   Cardiovascular:      Rate and Rhythm: Normal rate and regular rhythm.      Pulses: Normal pulses.      Heart sounds: Normal heart sounds. No murmur heard.  Pulmonary:      Effort: Pulmonary effort is normal.      Breath sounds: Normal breath sounds.   Skin:     General: Skin is warm and dry.      Findings: Lesion (2 cm annular cyst, pustular, surrounding erythema) present.   Neurological:      Mental Status: She is alert and oriented to person, place, and time.   Psychiatric:         Mood and Affect: Mood normal.         Behavior: Behavior normal.         Thought Content: Thought content normal.         Judgment: Judgment normal.       Assessment/Plan    1. Moderate episode of recurrent major depressive disorder (HCC)  Uncontrolled  Advised to stop Geodon and Effexor  Psy referrals provided  - desvenlafaxine succinate (PRISTIQ) 50  MG TB24 extended release tablet; Take 1 tablet by mouth daily  Dispense: 30 tablet; Refill: 3  - Crystal Sue MD, General Surgery, [facility]    2. Sebaceous cyst of skin of breast, unspecified laterality  Referral to General Surgery  - doxycycline hyclate (VIBRA-TABS) 100 MG tablet; Take 1 tablet by mouth 2 times daily for 10 days  Dispense: 20 tablet; Refill: 0      Return in about 1 month (around 3/13/2023). This dictation was generated by voice recognition computer software. Although all attempts are made to edit the dictation for accuracy, there may be errors in the transcription that are not intended.

## 2023-02-13 NOTE — PATIENT INSTRUCTIONS
Psychiatry    Psychology Today  Resource to find providers  Www. psychologytoday. [facility]  [phone]    [address] [place] [address]   [place], [address] [facility]   Phone: [phone]    Fax: [phone] [facility] Services  800 [facility]  400 [facility] 7501 [facility]   [phone]    [facility]  Dr. Dorthy Bolds, MD Dr. Melvina Saba, MD Leavy Apgar, MD  [address] [place] [address],   [place], 2500 [facility]   [phone]    Merritt Campbell, PhD  Edward Kapadia, PhD  Darwin Singleton, PhD  4303 [facility]  [phone]    65 [facility], [address] [place] [address] [address]  [phone]    [facility], 0 [facility]. [place], 800 [facility]  [phone]    Dr. Loreto Tovar  [phone] (outpatient)      Dr. Silvia Delgado  [place], [place]  [phone]    Dr. Ebony Van MD  32[address] [facility].  [facility]. [address]  [phone]    Dr. Missy Fu MD  [facility] 412.  [facility]. [address]    Wilson Fu MD   [phone]    [facility] for [facility]  [phone]    [facility]  Dr. Jin Mondragon  [phone] [address] [place] [facility] the [facility] [address], [address]  [address]  Phone [phone], Fax [phone]    Substance Abuse  [facility]  [address] [place] [address]. [address]  Phone  [phone]  Walk-in treatment assessments Monday- Friday 8AM-2PM      [place] [place] [address].   [address]  Phone [phone]

## 2023-02-28 ENCOUNTER — OFFICE VISIT (OUTPATIENT)
Dept: SURGERY | Age: 28
End: 2023-02-28
Payer: COMMERCIAL

## 2023-02-28 VITALS — WEIGHT: 293 LBS | DIASTOLIC BLOOD PRESSURE: 80 MMHG | BODY MASS INDEX: 48.81 KG/M2 | SYSTOLIC BLOOD PRESSURE: 121 MMHG

## 2023-02-28 DIAGNOSIS — L73.2 SUPPURATIVE HIDRADENITIS: Primary | ICD-10-CM

## 2023-02-28 PROCEDURE — 99202 OFFICE O/P NEW SF 15 MIN: CPT | Performed by: SURGERY

## 2023-02-28 NOTE — LETTER
March 1, 2023      PETE Bill NP  [address] [place] [address]      Patient: Jen Chase   MR Number: [id number]   YOB: 1995   Date of Visit: 2/28/2023       Dear Dr. Medina:    Thank you for referring Jen Chase to me for evaluation/treatment. Below are the relevant portions of my assessment and plan of care.            If you have questions, please do not hesitate to call me. I look forward to following Jen along with you.    Sincerely,        Hermelindo Fletcher MD    CC providers:  PETE Bill NP  [address] [place] [address]  Via In Basket

## 2023-03-01 ASSESSMENT — ENCOUNTER SYMPTOMS
RESPIRATORY NEGATIVE: 1
GASTROINTESTINAL NEGATIVE: 1
EYES NEGATIVE: 1
ALLERGIC/IMMUNOLOGIC NEGATIVE: 1

## 2023-03-09 NOTE — PROGRESS NOTES
Name_______________________________________Printed:____________________  Date and time of surgery___3/13/2023   0830_____________________Ar[place] Time:____0700   [facility] [place]   1. The instructions given regarding when and if a patient needs to stop oral intake prior to surgery varies. Follow the specific instructions you were given                  __X_Nothing to eat or to drink after Midnight the night before.                   ____Carbo loading or instructions will be given to select patients-if you have been given those instructions -please do the following                           The evening before your surgery after dinner before midnight drink 40 ounces of gatorade. If you are diabetic use sugar free. The morning of surgery drink 40 ounces of water. This needs to be finished 3 hours prior to your surgery start time. 2. Take the following pills with a small sip of water on the morning of surgery____PRISTIQ_______________________________________________                  Do not take blood pressure medications ending in pril or sartan the holli prior to surgery or the morning of surgery. Dr Anny Jeffrey patient are not to take any medications the AM of surgery. 3. Aspirin, Ibuprofen, Advil, Naproxen, Vitamin E and other Anti-inflammatory products and supplements should be stopped for 5 -7days before surgery or as directed by your physician. 4. Check with your Doctor regarding stopping Plavix, Coumadin,Eliquis, Lovenox,Effient,Pradaxa,Xarelto, Fragmin or other blood thinners and follow their instructions. 5. Do not smoke, and do not drink any alcoholic beverages 24 hours prior to surgery. This includes NA Beer. Refrain from the usage of any recreational drugs. 6. You may brush your teeth and gargle the morning of surgery. DO NOT SWALLOW WATER   7. You MUST make arrangements for a responsible adult to stay on site while you are here and take you home after your surgery.  You will not be allowed to leave alone or drive yourself home. It is strongly suggested someone stay with you the first 24 hrs. Your surgery will be cancelled if you do not have a ride home. 8. A parent/legal guardian must accompany a child scheduled for surgery and plan to stay at the hospital until the child is discharged. Please do not bring other children with you. 9. Please wear simple, loose fitting clothing to the hospital.  Ramírez Duff not bring valuables (money, credit cards, checkbooks, etc.) Do not wear any makeup (including no eye makeup) or nail polish on your fingers or toes. 10. DO NOT wear any jewelry or piercings on day of surgery. All body piercing jewelry must be removed. 11. If you have ___dentures, they will be removed before going to the OR; we will provide you a container. If you wear __X_contact lenses or __X_glasses, they will be removed; please bring a case for them. 12. Please see your family doctor/pediatrician for a history & physical and/or concerning medications. Bring any test results/reports from your physician's office. PCP__________________Phone___________H&P Appt. Date________             13 If you  have a Living Will and Durable Power of  for Healthcare, please bring in a copy. 15. Notify your Surgeon if you develop any illness between now and surgery  time, cough, cold, fever, sore throat, nausea, vomiting, etc.  Please notify your surgeon if you experience dizziness, shortness of breath or blurred vision between now & the time of your surgery             15. DO NOT shave your operative site 96 hours prior to surgery. For face & neck surgery, men may use an electric razor 48 hours prior to surgery. 16. Shower the night before or morning of surgery using an antibacterial soap or as you have been instructed. 17. To provide excellent care visitors will be limited to one in the room at any given time.              18.  Please bring picture ID and insurance card. 19.  Visit our web site for additional information:  [email or web address]              20.During flu season no children under the age of 15 are permitted in the hospital for the safety of all patients. 21. If you take a long acting insulin in the evening only  take half of your usual  dose the night  before your procedure              22. If you use a c-pap please bring DOS if staying overnight,             23.For your convenience [facility] has a pharmacy on site to fill your prescriptions. 24. If you use oxygen and have a portable tank please bring it  with you the DOS             25. Bring a complete list of all your medications with name and dose include any supplements. 26. Other__________________________________________   *Please call pre admission testing if you any further questions   [place] [id number]. Air  [phone]   [address] [place] [address]       VISITOR POLICY(subject to change)    Current policy is 2 visitors per patient. No children. Mask is  at the discretion of the facility. Visiting hours are 8a-8p. Overnight visitors will be at the discretion of the nurse. All policies subject to change. All above information reviewed with patient in person or by phone. Patient verbalizes understanding. All questions and concerns addressed.                                                                                                  Patient/Rep____PATIENT________________                                                                                                                                    PRE OP INSTRUCTIONS

## 2023-03-12 ENCOUNTER — ANESTHESIA EVENT (OUTPATIENT)
Dept: OPERATING ROOM | Age: 28
End: 2023-03-12
Payer: COMMERCIAL

## 2023-03-13 ENCOUNTER — ANESTHESIA (OUTPATIENT)
Dept: OPERATING ROOM | Age: 28
End: 2023-03-13
Payer: COMMERCIAL

## 2023-03-13 ENCOUNTER — HOSPITAL ENCOUNTER (OUTPATIENT)
Age: 28
Setting detail: OUTPATIENT SURGERY
Discharge: HOME | End: 2023-03-13
Attending: SURGERY | Admitting: SURGERY
Payer: COMMERCIAL

## 2023-03-13 ENCOUNTER — TELEPHONE (OUTPATIENT)
Dept: SURGERY | Age: 28
End: 2023-03-13

## 2023-03-13 VITALS
OXYGEN SATURATION: 97 % | SYSTOLIC BLOOD PRESSURE: 108 MMHG | WEIGHT: 293 LBS | TEMPERATURE: 97.3 F | HEIGHT: 66 IN | BODY MASS INDEX: 47.09 KG/M2 | DIASTOLIC BLOOD PRESSURE: 84 MMHG | RESPIRATION RATE: 20 BRPM | HEART RATE: 82 BPM

## 2023-03-13 DIAGNOSIS — L73.2 HIDRADENITIS: ICD-10-CM

## 2023-03-13 LAB — HCG(URINE) PREGNANCY TEST: NEGATIVE

## 2023-03-13 PROCEDURE — 2500000003 HC RX 250 WO HCPCS: Performed by: SURGERY

## 2023-03-13 PROCEDURE — 2580000003 HC RX 258: Performed by: SURGERY

## 2023-03-13 PROCEDURE — 3700000001 HC ADD 15 MINUTES (ANESTHESIA): Performed by: SURGERY

## 2023-03-13 PROCEDURE — 7100000011 HC PHASE II RECOVERY - ADDTL 15 MIN: Performed by: SURGERY

## 2023-03-13 PROCEDURE — 6360000002 HC RX W HCPCS: Performed by: SURGERY

## 2023-03-13 PROCEDURE — 3600000002 HC SURGERY LEVEL 2 BASE: Performed by: SURGERY

## 2023-03-13 PROCEDURE — 3700000000 HC ANESTHESIA ATTENDED CARE: Performed by: SURGERY

## 2023-03-13 PROCEDURE — 2580000003 HC RX 258: Performed by: ANESTHESIOLOGY

## 2023-03-13 PROCEDURE — 84703 CHORIONIC GONADOTROPIN ASSAY: CPT

## 2023-03-13 PROCEDURE — 2709999900 HC NON-CHARGEABLE SUPPLY: Performed by: SURGERY

## 2023-03-13 PROCEDURE — 6370000000 HC RX 637 (ALT 250 FOR IP): Performed by: ANESTHESIOLOGY

## 2023-03-13 PROCEDURE — 7100000000 HC PACU RECOVERY - FIRST 15 MIN: Performed by: SURGERY

## 2023-03-13 PROCEDURE — 88304 TISSUE EXAM BY PATHOLOGIST: CPT

## 2023-03-13 PROCEDURE — A4217 STERILE WATER/SALINE, 500 ML: HCPCS | Performed by: SURGERY

## 2023-03-13 PROCEDURE — 6360000002 HC RX W HCPCS: Performed by: NURSE ANESTHETIST, CERTIFIED REGISTERED

## 2023-03-13 PROCEDURE — 7100000001 HC PACU RECOVERY - ADDTL 15 MIN: Performed by: SURGERY

## 2023-03-13 PROCEDURE — 2500000003 HC RX 250 WO HCPCS: Performed by: NURSE ANESTHETIST, CERTIFIED REGISTERED

## 2023-03-13 PROCEDURE — 7100000010 HC PHASE II RECOVERY - FIRST 15 MIN: Performed by: SURGERY

## 2023-03-13 PROCEDURE — 3600000012 HC SURGERY LEVEL 2 ADDTL 15MIN: Performed by: SURGERY

## 2023-03-13 PROCEDURE — 6360000002 HC RX W HCPCS

## 2023-03-13 PROCEDURE — 11406 EXC TR-EXT B9+MARG >4.0 CM: CPT | Performed by: SURGERY

## 2023-03-13 RX ORDER — LIDOCAINE HYDROCHLORIDE 10 MG/ML
1 INJECTION, SOLUTION EPIDURAL; INFILTRATION; INTRACAUDAL; PERINEURAL
Status: DISCONTINUED | OUTPATIENT
Start: 2023-03-13 | End: 2023-03-13 | Stop reason: HOSPADM

## 2023-03-13 RX ORDER — PROCHLORPERAZINE EDISYLATE 5 MG/ML
5 INJECTION INTRAMUSCULAR; INTRAVENOUS
Status: DISCONTINUED | OUTPATIENT
Start: 2023-03-13 | End: 2023-03-13 | Stop reason: HOSPADM

## 2023-03-13 RX ORDER — FENTANYL CITRATE 50 UG/ML
25 INJECTION, SOLUTION INTRAMUSCULAR; INTRAVENOUS EVERY 5 MIN PRN
Status: DISCONTINUED | OUTPATIENT
Start: 2023-03-13 | End: 2023-03-13 | Stop reason: HOSPADM

## 2023-03-13 RX ORDER — LABETALOL HYDROCHLORIDE 5 MG/ML
10 INJECTION, SOLUTION INTRAVENOUS
Status: DISCONTINUED | OUTPATIENT
Start: 2023-03-13 | End: 2023-03-13 | Stop reason: HOSPADM

## 2023-03-13 RX ORDER — SODIUM CHLORIDE 0.9 % (FLUSH) 0.9 %
5-40 SYRINGE (ML) INJECTION EVERY 12 HOURS SCHEDULED
Status: DISCONTINUED | OUTPATIENT
Start: 2023-03-13 | End: 2023-03-13 | Stop reason: HOSPADM

## 2023-03-13 RX ORDER — SODIUM CHLORIDE 0.9 % (FLUSH) 0.9 %
5-40 SYRINGE (ML) INJECTION PRN
Status: DISCONTINUED | OUTPATIENT
Start: 2023-03-13 | End: 2023-03-13 | Stop reason: HOSPADM

## 2023-03-13 RX ORDER — SODIUM CHLORIDE 9 MG/ML
INJECTION, SOLUTION INTRAVENOUS PRN
Status: DISCONTINUED | OUTPATIENT
Start: 2023-03-13 | End: 2023-03-13 | Stop reason: HOSPADM

## 2023-03-13 RX ORDER — ONDANSETRON 2 MG/ML
4 INJECTION INTRAMUSCULAR; INTRAVENOUS
Status: DISCONTINUED | OUTPATIENT
Start: 2023-03-13 | End: 2023-03-13 | Stop reason: HOSPADM

## 2023-03-13 RX ORDER — PROPOFOL 10 MG/ML
INJECTION, EMULSION INTRAVENOUS PRN
Status: DISCONTINUED | OUTPATIENT
Start: 2023-03-13 | End: 2023-03-13 | Stop reason: SDUPTHER

## 2023-03-13 RX ORDER — SODIUM CHLORIDE 9 MG/ML
25 INJECTION, SOLUTION INTRAVENOUS PRN
Status: DISCONTINUED | OUTPATIENT
Start: 2023-03-13 | End: 2023-03-13 | Stop reason: HOSPADM

## 2023-03-13 RX ORDER — MAGNESIUM HYDROXIDE 1200 MG/15ML
LIQUID ORAL CONTINUOUS PRN
Status: DISCONTINUED | OUTPATIENT
Start: 2023-03-13 | End: 2023-03-13 | Stop reason: HOSPADM

## 2023-03-13 RX ORDER — SUCCINYLCHOLINE/SOD CL,ISO/PF 200MG/10ML
SYRINGE (ML) INTRAVENOUS PRN
Status: DISCONTINUED | OUTPATIENT
Start: 2023-03-13 | End: 2023-03-13 | Stop reason: SDUPTHER

## 2023-03-13 RX ORDER — LIDOCAINE HYDROCHLORIDE 20 MG/ML
INJECTION, SOLUTION INFILTRATION; PERINEURAL PRN
Status: DISCONTINUED | OUTPATIENT
Start: 2023-03-13 | End: 2023-03-13 | Stop reason: SDUPTHER

## 2023-03-13 RX ORDER — MIDAZOLAM HYDROCHLORIDE 1 MG/ML
INJECTION INTRAMUSCULAR; INTRAVENOUS PRN
Status: DISCONTINUED | OUTPATIENT
Start: 2023-03-13 | End: 2023-03-13 | Stop reason: SDUPTHER

## 2023-03-13 RX ORDER — HYDRALAZINE HYDROCHLORIDE 20 MG/ML
10 INJECTION INTRAMUSCULAR; INTRAVENOUS
Status: DISCONTINUED | OUTPATIENT
Start: 2023-03-13 | End: 2023-03-13 | Stop reason: HOSPADM

## 2023-03-13 RX ORDER — HYDROMORPHONE HCL 110MG/55ML
PATIENT CONTROLLED ANALGESIA SYRINGE INTRAVENOUS
Status: COMPLETED
Start: 2023-03-13 | End: 2023-03-13

## 2023-03-13 RX ORDER — DEXAMETHASONE SODIUM PHOSPHATE 4 MG/ML
INJECTION, SOLUTION INTRA-ARTICULAR; INTRALESIONAL; INTRAMUSCULAR; INTRAVENOUS; SOFT TISSUE PRN
Status: DISCONTINUED | OUTPATIENT
Start: 2023-03-13 | End: 2023-03-13 | Stop reason: SDUPTHER

## 2023-03-13 RX ORDER — ONDANSETRON 2 MG/ML
INJECTION INTRAMUSCULAR; INTRAVENOUS PRN
Status: DISCONTINUED | OUTPATIENT
Start: 2023-03-13 | End: 2023-03-13 | Stop reason: SDUPTHER

## 2023-03-13 RX ORDER — HYDROCODONE BITARTRATE AND ACETAMINOPHEN 5; 325 MG/1; MG/1
1 TABLET ORAL EVERY 4 HOURS PRN
Qty: 15 TABLET | Refills: 0 | Status: SHIPPED | OUTPATIENT
Start: 2023-03-13 | End: 2023-03-20

## 2023-03-13 RX ORDER — HYDROMORPHONE HCL 110MG/55ML
0.5 PATIENT CONTROLLED ANALGESIA SYRINGE INTRAVENOUS EVERY 5 MIN PRN
Status: DISCONTINUED | OUTPATIENT
Start: 2023-03-13 | End: 2023-03-13 | Stop reason: HOSPADM

## 2023-03-13 RX ORDER — BUPIVACAINE HYDROCHLORIDE AND EPINEPHRINE 5; 5 MG/ML; UG/ML
INJECTION, SOLUTION EPIDURAL; INTRACAUDAL; PERINEURAL
Status: COMPLETED | OUTPATIENT
Start: 2023-03-13 | End: 2023-03-13

## 2023-03-13 RX ORDER — ROCURONIUM BROMIDE 10 MG/ML
INJECTION, SOLUTION INTRAVENOUS PRN
Status: DISCONTINUED | OUTPATIENT
Start: 2023-03-13 | End: 2023-03-13 | Stop reason: SDUPTHER

## 2023-03-13 RX ORDER — SODIUM CHLORIDE 9 MG/ML
INJECTION, SOLUTION INTRAVENOUS CONTINUOUS
Status: DISCONTINUED | OUTPATIENT
Start: 2023-03-13 | End: 2023-03-13 | Stop reason: HOSPADM

## 2023-03-13 RX ORDER — SULFAMETHOXAZOLE AND TRIMETHOPRIM 800; 160 MG/1; MG/1
1 TABLET ORAL 2 TIMES DAILY
Qty: 6 TABLET | Refills: 0 | Status: SHIPPED | OUTPATIENT
Start: 2023-03-13 | End: 2023-03-16

## 2023-03-13 RX ORDER — OXYCODONE HYDROCHLORIDE 5 MG/1
5 TABLET ORAL
Status: COMPLETED | OUTPATIENT
Start: 2023-03-13 | End: 2023-03-13

## 2023-03-13 RX ORDER — FENTANYL CITRATE 50 UG/ML
INJECTION, SOLUTION INTRAMUSCULAR; INTRAVENOUS PRN
Status: DISCONTINUED | OUTPATIENT
Start: 2023-03-13 | End: 2023-03-13 | Stop reason: SDUPTHER

## 2023-03-13 RX ADMIN — DEXAMETHASONE SODIUM PHOSPHATE 8 MG: 4 INJECTION, SOLUTION INTRAMUSCULAR; INTRAVENOUS at 09:11

## 2023-03-13 RX ADMIN — PROPOFOL 200 MG: 10 INJECTION, EMULSION INTRAVENOUS at 08:59

## 2023-03-13 RX ADMIN — FENTANYL CITRATE 50 MCG: 50 INJECTION, SOLUTION INTRAMUSCULAR; INTRAVENOUS at 08:52

## 2023-03-13 RX ADMIN — Medication 3000 MG: at 08:49

## 2023-03-13 RX ADMIN — Medication 160 MG: at 09:00

## 2023-03-13 RX ADMIN — LIDOCAINE HYDROCHLORIDE 100 MG: 20 INJECTION, SOLUTION INFILTRATION; PERINEURAL at 08:59

## 2023-03-13 RX ADMIN — MIDAZOLAM 2 MG: 1 INJECTION INTRAMUSCULAR; INTRAVENOUS at 08:45

## 2023-03-13 RX ADMIN — FENTANYL CITRATE 50 MCG: 50 INJECTION, SOLUTION INTRAMUSCULAR; INTRAVENOUS at 09:22

## 2023-03-13 RX ADMIN — OXYCODONE HYDROCHLORIDE 5 MG: 5 TABLET ORAL at 10:06

## 2023-03-13 RX ADMIN — Medication 0.5 MG: at 09:45

## 2023-03-13 RX ADMIN — ROCURONIUM BROMIDE 30 MG: 10 INJECTION, SOLUTION INTRAVENOUS at 09:08

## 2023-03-13 RX ADMIN — ONDANSETRON 4 MG: 2 INJECTION INTRAMUSCULAR; INTRAVENOUS at 09:11

## 2023-03-13 RX ADMIN — SODIUM CHLORIDE: 9 INJECTION, SOLUTION INTRAVENOUS at 07:49

## 2023-03-13 RX ADMIN — SUGAMMADEX 200 MG: 100 INJECTION, SOLUTION INTRAVENOUS at 09:21

## 2023-03-13 RX ADMIN — HYDROMORPHONE HYDROCHLORIDE 0.5 MG: 2 INJECTION, SOLUTION INTRAMUSCULAR; INTRAVENOUS; SUBCUTANEOUS at 09:45

## 2023-03-13 ASSESSMENT — PAIN SCALES - GENERAL
PAINLEVEL_OUTOF10: 5
PAINLEVEL_OUTOF10: 3
PAINLEVEL_OUTOF10: 7

## 2023-03-13 ASSESSMENT — PAIN DESCRIPTION - ORIENTATION
ORIENTATION: MID
ORIENTATION: MID

## 2023-03-13 ASSESSMENT — PAIN DESCRIPTION - DESCRIPTORS
DESCRIPTORS: BURNING
DESCRIPTORS: BURNING;DISCOMFORT

## 2023-03-13 ASSESSMENT — PAIN DESCRIPTION - LOCATION
LOCATION: CHEST
LOCATION: CHEST

## 2023-03-13 ASSESSMENT — ENCOUNTER SYMPTOMS: SHORTNESS OF BREATH: 0

## 2023-03-13 ASSESSMENT — PAIN - FUNCTIONAL ASSESSMENT: PAIN_FUNCTIONAL_ASSESSMENT: 0-10

## 2023-03-13 NOTE — DISCHARGE INSTRUCTIONS
Lupe Garrido M.D.    [phone] [address] [facility] [address] [facility], [address]    Excision discharge instructions    Resume regular diet  May drive tomorrow  May shower, remove gauze in shower daily, irrigate and clean wound out with warm soapy water and replace a wet to dry dressing. Do daily. No heavy lifting for 24 hours  May use pain meds and ice on the surgical site as needed  Call the office as needed for any post op questions and for follow up in two weeks     [address] your seatbelt home. You are under the influence of drugs-do not drink alcohol, drive, operate machinery, make any important decisions or sign any legal documents for 24 hours. Children should not ride bikes, [place] or play on gym sets for 24 hours after surgery. A responsible adult needs to be with you for 24 hours. You may experience lightheadedness, dizziness, or sleepiness following surgery. Rest at home today- increase activity as tolerated. Progress slowly to a regular diet unless your physician has instructed you otherwise. Drink plenty of water. If persistent nausea and vomiting becomes a problem, call your physician. Coughing, sore throat and muscle aches are other side effects of anesthesia, and should improve with time. Do not drive or operate machinery while taking narcotics. Females of childbearing potential and on hormonal birth control, should use two forms of contraception following procedure if given a medication called sugammadex and/or emend. Additional contraception should be used for 7 days for sugammadex and/or 28 days for emend. These medications have a potential to reduce the effectiveness of hormonal birth control.

## 2023-03-13 NOTE — H&P
[facility]      I have reviewed the history and physical and examined the patient and find no relevant changes. I have reviewed with the patient and/or family the risks, benefits, and alternatives to the procedure.     Hanna Anderson MD  3/13/2023

## 2023-03-13 NOTE — ANESTHESIA POSTPROCEDURE EVALUATION
Department of Anesthesiology  Postprocedure Note    Patient: Merline Libra  MRN: [id number]  YOB: 1995  Date of evaluation: 3/13/2023      Procedure Summary     Date: 03/13/23 Room / Location: [address]    Anesthesia Start: 7279 Anesthesia Stop: 2240    Procedure: EXCISION OF CHEST WALL HIDRADENITIS (Chest) Diagnosis:       Hidradenitis      (Hidradenitis [L73.2])    Surgeons: Emery Greenwood MD Responsible Provider: Manuel De La Vega MD    Anesthesia Type: general ASA Status: 2          Anesthesia Type: No value filed.     Lilia Phase I: Lilia Score: 10    Lilia Phase II:        Anesthesia Post Evaluation    Patient location during evaluation: PACU  Patient participation: complete - patient participated  Level of consciousness: awake and alert  Airway patency: patent  Nausea & Vomiting: no nausea and no vomiting  Complications: no  Cardiovascular status: hemodynamically stable  Respiratory status: acceptable  Hydration status: stable  Multimodal analgesia pain management approach
English

## 2023-03-13 NOTE — TELEPHONE ENCOUNTER
PATIENT HAD SURGERY TODAY AND WAS GIVEN RX FOR [address]. PATIENT IS ALSO TAKING SUBOXONE. DO YOU STILL WANT HER TO HAVE [address]? Maxi Dutton. -Jacob Cho [phone]

## 2023-03-13 NOTE — PROGRESS NOTES
Dilaudid 0.5 mg IV for pain at surgical site rated 7/10 described as a burning sensation. Drinking soda, moved to phase 2 level of care at this time.

## 2023-03-13 NOTE — PROGRESS NOTES
Mom to bedside. Discharge instructions explained to pt and mother. Pt to do wet to dry dressings daily at home. Supplies and teaching provided with understanding voiced. PIV d/c'd.

## 2023-03-13 NOTE — BRIEF OP NOTE
Brief Postoperative Note      Patient: Royal Méndez  YOB: 1995  MRN: [id number]    Date of Procedure: 3/13/2023    Pre-Op Diagnosis: Hidradenitis [L73.2]    Post-Op Diagnosis: Same       Procedure(s):  EXCISION OF CHEST WALL HIDRADENITIS    Surgeon(s):  Donna Martínez MD    Assistant:  First Assistant: Arely Banuelos    Anesthesia: General    Estimated Blood Loss (mL): Minimal    Complications: None    Specimens:   ID Type Source Tests Collected by Time Destination   A : A- CHEST WALL HIDRANDENITIS Tissue Tissue SURGICAL PATHOLOGY Donna Martínez MD 3/13/2023 0913        Implants:  * No implants in log *      Drains: * No LDAs found *    Findings: Excision completed, recurring infected area in mid chest.    Electronically signed by Donna Martínez MD on 3/13/2023 at 9:23 AM

## 2023-03-13 NOTE — PROGRESS NOTES
Pt arrived to PACU from OR in Stable condition and is RASS -1 (Drowsy) . Respirations are Regular Pattern; RR 8-20 = 17 on 5L O2 per  simple mask . Skin warm and dry. Abd is  soft. Pain: denies at this time. Mid chest surgical site(s) intact with dressing= clean, dry, intact, and nontender. Will continue to monitor for safety and comfort. S/P: EXCISION OF CHEST WALL HIDRADENITIS (Chest), with Dr. Fitch Payment at [facility].

## 2023-03-13 NOTE — ANESTHESIA PRE PROCEDURE
Department of Anesthesiology  Preprocedure Note       Name:  Noa Portillo   Age:  34 y.o.  :  1995                                          MRN:  [id number]         Date:  3/13/2023      Surgeon: Evi Gonzalez):  Tiffanie Bingham MD    Procedure: Procedure(s):  EXCISION OF CHEST WALL HIDRADENITIS    Medications prior to admission:   Prior to Admission medications    Medication Sig Start Date End Date Taking? Authorizing Provider   desvenlafaxine succinate (PRISTIQ) 50 MG TB24 extended release tablet Take 1 tablet by mouth daily 23   PETE Moses NP   venlafaxine (EFFEXOR XR) 150 MG extended release capsule Take 1 capsule by mouth daily  Patient not taking: No sig reported 10/31/22   PETE Ferrer CNP   fluticasone-salmeterol (ADVAIR HFA) 115-21 MCG/ACT inhaler Inhale 2 puffs into the lungs 2 times daily 22   PETE Moses NP   albuterol sulfate HFA (PROVENTIL HFA) 108 (90 Base) MCG/ACT inhaler Inhale 2 puffs into the lungs every 4 hours as needed for Wheezing or Shortness of Breath With spacer (and mask if indicated). Thanks.  22  PETE Moses NP   ibuprofen (ADVIL;MOTRIN) 200 MG tablet Take 200 mg by mouth every 6 hours as needed for Pain    Historical Provider, MD       Current medications:    Current Facility-Administered Medications   Medication Dose Route Frequency Provider Last Rate Last Admin    0.9 % sodium chloride infusion   IntraVENous Continuous Steven Lorenzo  mL/hr at 23 0749 New Bag at 23 0749       Allergies:  No Known Allergies    Problem List:    Patient Active Problem List   Diagnosis Code    Moderate persistent asthma with acute exacerbation J45.41    Morbid obesity with body mass index (BMI) of 40.0 to 44.9 in adult ([facility]) E66.01, Z68.41    Acute sinusitis J01.90    Fever R50.9    Moderate episode of recurrent major depressive disorder ([facility] [address] F33.1    Alcohol withdrawal syndrome without complication (HCC) O61.448       Past Medical History:        Diagnosis Date    Asthma        Past Surgical History:  History reviewed. No pertinent surgical history. Social History:    Social History     Tobacco Use    Smoking status: Every Day     Packs/day: 0.50     Years: 8.00     Pack years: 4.00     Types: Cigarettes    Smokeless tobacco: Never   Substance Use Topics    Alcohol use: Yes     Comment: OCCAS                                Ready to quit: Not Answered  Counseling given: Not Answered      Vital Signs (Current):   Vitals:    03/09/23 0910 03/13/23 0716   BP:  130/87   Pulse:  70   Resp:  16   Temp:  97.5 °F (36.4 °C)   TempSrc:  Temporal   SpO2:  99%   Weight: 300 lb (136.1 kg) 297 lb 3.2 oz (134.8 kg)   Height: 5' 6\" (1.676 m) 5' 6\" (1.676 m)                                              BP Readings from Last 3 Encounters:   03/13/23 130/87   02/28/23 121/80   02/13/23 123/71       NPO Status: Time of last liquid consumption: 2200                        Time of last solid consumption: 0000                        Date of last liquid consumption: 03/12/23                        Date of last solid food consumption: 03/12/23    BMI:   Wt Readings from Last 3 Encounters:   03/13/23 297 lb 3.2 oz (134.8 kg)   02/28/23 (!) 307 lb (139.3 kg)   02/13/23 (!) 307 lb (139.3 kg)     Body mass index is 47.97 kg/m².     CBC:   Lab Results   Component Value Date/Time    WBC 6.7 04/30/2022 10:04 AM    RBC 4.34 04/30/2022 10:04 AM    HGB 13.4 04/30/2022 10:04 AM    HCT 40.9 04/30/2022 10:04 AM    MCV 94 04/30/2022 10:04 AM    RDW 12.7 04/30/2022 10:04 AM     04/30/2022 10:04 AM       CMP:   Lab Results   Component Value Date/Time     04/30/2022 10:04 AM    K 4.3 04/30/2022 10:04 AM     04/30/2022 10:04 AM    CO2 20 04/30/2022 10:04 AM    BUN 14 04/30/2022 10:04 AM    CREATININE 0.69 04/30/2022 10:04 AM    GFRAA 144 02/27/2021 10:27 AM    AGRATIO 2.0 04/30/2022 10:04 AM    LABGLOM 125 02/27/2021 10:27 AM    GLUCOSE 86 04/30/2022 10:04 AM    PROT 6.4 04/30/2022 10:04 AM    CALCIUM 9.1 04/30/2022 10:04 AM    BILITOT 0.4 04/30/2022 10:04 AM    ALKPHOS 64 04/30/2022 10:04 AM    AST 19 04/30/2022 10:04 AM    ALT 17 04/30/2022 10:04 AM       POC Tests: No results for input(s): POCGLU, POCNA, POCK, POCCL, POCBUN, POCHEMO, POCHCT in the last 72 hours. Coags: No results found for: PROTIME, INR, APTT    HCG (If Applicable):   Lab Results   Component Value Date    PREGTESTUR Negative 03/13/2023        ABGs: No results found for: PHART, PO2ART, GJD0GNN, THB2KFK, BEART, H3PGKIDQ     Type & Screen (If Applicable):  No results found for: LABABO, LABRH    Drug/Infectious Status (If Applicable):  No results found for: HIV, HEPCAB    COVID-19 Screening (If Applicable): No results found for: COVID19        Anesthesia Evaluation  Patient summary reviewed and Nursing notes reviewed no history of anesthetic complications:   Airway: Mallampati: I  TM distance: >3 FB   Neck ROM: full  Mouth opening: > = 3 FB   Dental: normal exam         Pulmonary:   (+) asthma:     (-) shortness of breath                           Cardiovascular:        (-) hypertension and  angina                Neuro/Psych:   (+) psychiatric history:   (-) CVA           GI/Hepatic/Renal:        (-) GERD and liver disease       Endo/Other:        (-) diabetes mellitus, hypothyroidism               Abdominal:             Vascular:     - PVD. Other Findings:           Anesthesia Plan      general     ASA 2       Induction: intravenous. MIPS: Postoperative opioids intended and Prophylactic antiemetics administered. Anesthetic plan and risks discussed with patient. Use of blood products discussed with patient whom. Plan discussed with CRNA.                     Kanwal Jones MD   3/13/2023

## 2023-03-14 NOTE — OP NOTE
[facility]                     [address] [facility], [address] [facility]                                OPERATIVE REPORT    PATIENT NAME: Dwayne Méndez                    :        1995  MED REC NO:   [id number]                          ROOM:  ACCOUNT NO:   [de-identified]                           ADMIT DATE: 2023  PROVIDER:     Donna Martínez MD    DATE OF PROCEDURE:  2023    PREOPERATIVE DIAGNOSES:  Hidradenitis and recurring infected cyst to the  chest.    POSTOPERATIVE DIAGNOSES:  Hidradenitis and recurring infected cyst to  the chest.    PROCEDURE:  Excision of chest wall, recurrent infected cyst and  hydradenitis area, 6.5 cm in size excision areas. .    SURGEON:  Donna Martínez MD    ANESTHESIA:  General plus local.    ESTIMATED BLOOD LOSS:  Minimal.    COMPLICATIONS:  None. DETAILS OF SURGERY:  The patient presents for excision of her midsternal  area with recurring pustules, drainage, and infection. Site of surgery  was marked, confirmed. All questions answered. Antibiotics were  administered. ADDITIONAL DETAILS OF SURGERY:  The patient was brought to the operating  room, placed on the operative table in supine position. Compression  boots were activated. General anesthetic was administered. The chest  walls were all prepped and draped sterilely and time-out was done. Local anesthetic was placed liberally around the surgical area and then  skin excision, taking the skin full-thickness and subcutaneous tissue of  all affected area was done at this site, removing a large area of  nodular change and recurring infected tissue. This was done with a  15-blade scalpel and Bovie electrocautery. Hemostasis was assured with  Bovie electrocautery and 3-0 Vicryl suture ligatures. The wound was  packed open with saline-soaked gauze. The patient taken to recovery  room in stable condition postop.         Deejay Cast MD    D: 03/13/2023 9:38:05       T: 03/13/2023 9:42:06     [email or web address]  Job#: [id number]     Doc#: [id number]    CC:  Isatu Camacho Primary Care Doctor

## 2023-04-01 DIAGNOSIS — J45.41 MODERATE PERSISTENT ASTHMA WITH ACUTE EXACERBATION: ICD-10-CM

## 2023-04-03 RX ORDER — ALBUTEROL SULFATE 90 UG/1
2 AEROSOL, METERED RESPIRATORY (INHALATION) EVERY 4 HOURS PRN
Qty: 2 EACH | Refills: 1 | Status: SHIPPED | OUTPATIENT
Start: 2023-04-03 | End: 2023-05-03

## 2023-04-17 ENCOUNTER — OFFICE VISIT (OUTPATIENT)
Dept: FAMILY MEDICINE CLINIC | Age: 28
End: 2023-04-17
Payer: COMMERCIAL

## 2023-04-17 VITALS — HEART RATE: 75 BPM | WEIGHT: 293 LBS | OXYGEN SATURATION: 98 % | TEMPERATURE: 98.4 F | BODY MASS INDEX: 47.94 KG/M2

## 2023-04-17 DIAGNOSIS — J45.41 MODERATE PERSISTENT ASTHMA WITH ACUTE EXACERBATION: ICD-10-CM

## 2023-04-17 DIAGNOSIS — F33.1 MODERATE EPISODE OF RECURRENT MAJOR DEPRESSIVE DISORDER (HCC): Primary | ICD-10-CM

## 2023-04-17 PROCEDURE — 99213 OFFICE O/P EST LOW 20 MIN: CPT | Performed by: NURSE PRACTITIONER

## 2023-04-17 RX ORDER — DESVENLAFAXINE SUCCINATE 50 MG/1
50 TABLET, EXTENDED RELEASE ORAL DAILY
Qty: 30 TABLET | Refills: 3 | Status: SHIPPED | OUTPATIENT
Start: 2023-04-17

## 2023-04-17 RX ORDER — BUSPIRONE HYDROCHLORIDE 10 MG/1
10 TABLET ORAL 2 TIMES DAILY
Qty: 60 TABLET | Refills: 0 | Status: SHIPPED | OUTPATIENT
Start: 2023-04-17 | End: 2023-05-17

## 2023-04-17 RX ORDER — VALACYCLOVIR HYDROCHLORIDE 500 MG/1
TABLET, FILM COATED ORAL
COMMUNITY
Start: 2023-03-08

## 2023-04-17 RX ORDER — METHOCARBAMOL 500 MG/1
TABLET, FILM COATED ORAL
COMMUNITY
Start: 2023-04-16

## 2023-04-17 SDOH — ECONOMIC STABILITY: INCOME INSECURITY: HOW HARD IS IT FOR YOU TO PAY FOR THE VERY BASICS LIKE FOOD, HOUSING, MEDICAL CARE, AND HEATING?: NOT HARD AT ALL

## 2023-04-17 SDOH — ECONOMIC STABILITY: FOOD INSECURITY: WITHIN THE PAST 12 MONTHS, THE FOOD YOU BOUGHT JUST DIDN'T LAST AND YOU DIDN'T HAVE MONEY TO GET MORE.: NEVER TRUE

## 2023-04-17 SDOH — ECONOMIC STABILITY: FOOD INSECURITY: WITHIN THE PAST 12 MONTHS, YOU WORRIED THAT YOUR FOOD WOULD RUN OUT BEFORE YOU GOT MONEY TO BUY MORE.: NEVER TRUE

## 2023-04-17 SDOH — ECONOMIC STABILITY: HOUSING INSECURITY
IN THE LAST 12 MONTHS, WAS THERE A TIME WHEN YOU DID NOT HAVE A STEADY PLACE TO SLEEP OR SLEPT IN A SHELTER (INCLUDING NOW)?: NO

## 2023-04-17 ASSESSMENT — PATIENT HEALTH QUESTIONNAIRE - PHQ9
SUM OF ALL RESPONSES TO PHQ QUESTIONS 1-9: 7
2. FEELING DOWN, DEPRESSED OR HOPELESS: 1
3. TROUBLE FALLING OR STAYING ASLEEP: 1
10. IF YOU CHECKED OFF ANY PROBLEMS, HOW DIFFICULT HAVE THESE PROBLEMS MADE IT FOR YOU TO DO YOUR WORK, TAKE CARE OF THINGS AT HOME, OR GET ALONG WITH OTHER PEOPLE: 1
9. THOUGHTS THAT YOU WOULD BE BETTER OFF DEAD, OR OF HURTING YOURSELF: 0
SUM OF ALL RESPONSES TO PHQ QUESTIONS 1-9: 7
SUM OF ALL RESPONSES TO PHQ QUESTIONS 1-9: 7
SUM OF ALL RESPONSES TO PHQ9 QUESTIONS 1 & 2: 2
6. FEELING BAD ABOUT YOURSELF - OR THAT YOU ARE A FAILURE OR HAVE LET YOURSELF OR YOUR FAMILY DOWN: 1
5. POOR APPETITE OR OVEREATING: 1
7. TROUBLE CONCENTRATING ON THINGS, SUCH AS READING THE NEWSPAPER OR WATCHING TELEVISION: 1
4. FEELING TIRED OR HAVING LITTLE ENERGY: 1
1. LITTLE INTEREST OR PLEASURE IN DOING THINGS: 1
SUM OF ALL RESPONSES TO PHQ QUESTIONS 1-9: 7
8. MOVING OR SPEAKING SO SLOWLY THAT OTHER PEOPLE COULD HAVE NOTICED. OR THE OPPOSITE, BEING SO FIGETY OR RESTLESS THAT YOU HAVE BEEN MOVING AROUND A LOT MORE THAN USUAL: 0

## 2023-04-17 ASSESSMENT — ENCOUNTER SYMPTOMS: BACK PAIN: 1

## 2023-04-17 NOTE — PROGRESS NOTES
Review of Systems   Musculoskeletal:  Positive for back pain. Past medical, surgical, family and social history were reviewed and updated with the patient. Objective:    Pulse 75   Temp 98.4 °F (36.9 °C) (Infrared)   Wt 297 lb (134.7 kg)   SpO2 98%   BMI 47.94 kg/m²   Weight: 297 lb (134.7 kg)     BP Readings from Last 3 Encounters:   03/13/23 108/84   02/28/23 121/80   02/13/23 123/71     Wt Readings from Last 3 Encounters:   04/17/23 297 lb (134.7 kg)   03/13/23 297 lb 3.2 oz (134.8 kg)   02/28/23 (!) 307 lb (139.3 kg)       Physical Exam  Vitals reviewed. Constitutional:       Appearance: Normal appearance. She is well-developed. HENT:      Right Ear: Tympanic membrane normal.      Left Ear: Tympanic membrane normal.   Cardiovascular:      Rate and Rhythm: Normal rate and regular rhythm. Pulses: Normal pulses. Heart sounds: Normal heart sounds, S1 normal and S2 normal. No murmur heard. Pulmonary:      Effort: Pulmonary effort is normal.      Breath sounds: Normal breath sounds. Musculoskeletal:         General: Tenderness (generalized upper back and shoulders) present. Skin:     General: Skin is warm and dry. Findings: No bruising or rash. Neurological:      Mental Status: She is alert and oriented to person, place, and time. Psychiatric:         Mood and Affect: Mood normal.         Behavior: Behavior normal. Behavior is cooperative. Thought Content: Thought content normal.         Judgment: Judgment normal.       Assessment/Plan    1. Moderate episode of recurrent major depressive disorder (HCC)  Pristiq 50 mg daily  Advised talking therapy  Buspar 10 mg BID  Self care    2. Moderate persistant asthma  Advised OTC antihistamine  Advair HFA    Jen Chase was counseled regarding symptoms of current diagnosis, course and complications of disease if inadequately treated.   Discussed side effects of medications, diagnosis, treatment options, and prognosis along

## 2023-07-31 ENCOUNTER — TELEPHONE (OUTPATIENT)
Dept: FAMILY MEDICINE CLINIC | Age: 28
End: 2023-07-31

## 2023-08-01 NOTE — TELEPHONE ENCOUNTER
Cannot pull up this PA using the supplied Key. Also, there was a PA Approval for Desvenlafaxine Succinate ER 50MG tablets on 2/14/23. Approval is valid through 2/14/24. Please let me know if patient changed her insurance, or if there is some other reason that another PA is needed. Thank you!

## 2023-08-07 ENCOUNTER — TELEPHONE (OUTPATIENT)
Dept: FAMILY MEDICINE CLINIC | Age: 28
End: 2023-08-07

## 2023-08-07 NOTE — TELEPHONE ENCOUNTER
Rx#: [id number]  Medication: Desvenlafaxine ER Succinate 50 mg , take one tab every day  Pharm[facility] [id number] [facility], [address] [place] [address]    Please start prior authorization. The patient has been re-examined and I agree with the above assessment or I updated with my findings.

## 2023-08-08 NOTE — TELEPHONE ENCOUNTER
Patient cannot be found on CMM. I called [facility] (PBM for patient's [place] plan) and spoke to [facility]. Yue Betancourt told me that patient's plan termed on 04/25/23. Please let me know if patient has a new pharmacy insurance that I can use to do the PA. Please respond to the pool ( [address] [facility] [address]). Thank you!

## 2023-08-22 ENCOUNTER — TELEPHONE (OUTPATIENT)
Dept: ADMINISTRATIVE | Age: 28
End: 2023-08-22

## 2023-08-22 ENCOUNTER — PATIENT MESSAGE (OUTPATIENT)
Dept: FAMILY MEDICINE CLINIC | Age: 28
End: 2023-08-22

## 2023-08-22 NOTE — TELEPHONE ENCOUNTER
Per My chart message the patient has new insurance. The patient did not even provide ID number for us to submit. In order for this team to submit PA's; Epic must be updated with the patient's current insurance info. If this requires a response please respond to the pool ( [address] [facility] [address]). Thank you please advise patient.

## 2023-08-23 NOTE — TELEPHONE ENCOUNTER
Patient's current insurance Tameka Candie has been uploaded and corrected in EMR, please proceed with PA for Pristiq DX F33.1.

## 2023-08-23 NOTE — TELEPHONE ENCOUNTER
The new insurance info needs updated in epic before we can submit PA. If this requires a response please respond to the pool ( [address] [facility] [address]). Thank you please advise patient.

## 2023-08-24 NOTE — TELEPHONE ENCOUNTER
Submitted PA for DESVENLAFAXINE  [facility] Key: [id number] STATUS: PENDING. Follow up done daily; if no response in three days we will refax for status check. If another three days goes by with no response we will call the insurance for status.

## 2023-08-25 NOTE — TELEPHONE ENCOUNTER
The medication is APPROVED. If this requires a response please respond to the pool ( [address] [facility] [address]). Thank you please advise patient.

## 2023-09-15 ENCOUNTER — OFFICE VISIT (OUTPATIENT)
Age: 28
End: 2023-09-15

## 2023-09-15 VITALS
HEIGHT: 66 IN | HEART RATE: 90 BPM | TEMPERATURE: 97.9 F | SYSTOLIC BLOOD PRESSURE: 136 MMHG | BODY MASS INDEX: 47.09 KG/M2 | WEIGHT: 293 LBS | DIASTOLIC BLOOD PRESSURE: 84 MMHG | OXYGEN SATURATION: 93 %

## 2023-09-15 DIAGNOSIS — J45.31 MILD PERSISTENT ASTHMA WITH ACUTE EXACERBATION: Primary | ICD-10-CM

## 2023-09-15 PROBLEM — B00.89 OTHER HERPESVIRAL INFECTION: Status: ACTIVE | Noted: 2023-02-24

## 2023-09-15 PROBLEM — F17.200 SMOKER: Status: ACTIVE | Noted: 2023-02-24

## 2023-09-15 RX ORDER — IPRATROPIUM BROMIDE AND ALBUTEROL SULFATE 2.5; .5 MG/3ML; MG/3ML
1 SOLUTION RESPIRATORY (INHALATION) ONCE
Status: DISCONTINUED | OUTPATIENT
Start: 2023-09-15 | End: 2023-09-15

## 2023-09-15 RX ORDER — ALBUTEROL SULFATE 1.25 MG/3ML
1.25 SOLUTION RESPIRATORY (INHALATION) ONCE
Status: SHIPPED | OUTPATIENT
Start: 2023-09-15

## 2023-09-15 RX ORDER — PREDNISONE 20 MG/1
TABLET ORAL
Qty: 18 TABLET | Refills: 0 | Status: SHIPPED | OUTPATIENT
Start: 2023-09-15

## 2023-09-15 RX ORDER — DEXAMETHASONE SODIUM PHOSPHATE 4 MG/ML
8 INJECTION, SOLUTION INTRA-ARTICULAR; INTRALESIONAL; INTRAMUSCULAR; INTRAVENOUS; SOFT TISSUE ONCE
Status: DISCONTINUED | OUTPATIENT
Start: 2023-09-15 | End: 2023-09-15

## 2023-09-15 ASSESSMENT — ENCOUNTER SYMPTOMS
CHEST TIGHTNESS: 1
WHEEZING: 1
COUGH: 1
SHORTNESS OF BREATH: 1
RHINORRHEA: 0
DIFFICULTY BREATHING: 1
SORE THROAT: 0

## 2023-12-07 ENCOUNTER — OFFICE VISIT (OUTPATIENT)
Dept: FAMILY MEDICINE CLINIC | Age: 28
End: 2023-12-07
Payer: COMMERCIAL

## 2023-12-07 VITALS — HEART RATE: 97 BPM | WEIGHT: 293 LBS | OXYGEN SATURATION: 98 % | TEMPERATURE: 97.2 F | BODY MASS INDEX: 53.1 KG/M2

## 2023-12-07 DIAGNOSIS — Z00.00 PREVENTATIVE HEALTH CARE: Primary | ICD-10-CM

## 2023-12-07 DIAGNOSIS — Z13.1 SCREENING FOR DIABETES MELLITUS: ICD-10-CM

## 2023-12-07 DIAGNOSIS — Z13.220 SCREENING FOR CHOLESTEROL LEVEL: ICD-10-CM

## 2023-12-07 DIAGNOSIS — F33.1 MODERATE EPISODE OF RECURRENT MAJOR DEPRESSIVE DISORDER (HCC): ICD-10-CM

## 2023-12-07 DIAGNOSIS — M79.671 CHRONIC FOOT PAIN, RIGHT: ICD-10-CM

## 2023-12-07 DIAGNOSIS — J45.41 MODERATE PERSISTENT ASTHMA WITH ACUTE EXACERBATION: ICD-10-CM

## 2023-12-07 DIAGNOSIS — G89.29 CHRONIC FOOT PAIN, RIGHT: ICD-10-CM

## 2023-12-07 PROCEDURE — G8484 FLU IMMUNIZE NO ADMIN: HCPCS | Performed by: NURSE PRACTITIONER

## 2023-12-07 PROCEDURE — 99395 PREV VISIT EST AGE 18-39: CPT | Performed by: NURSE PRACTITIONER

## 2023-12-07 RX ORDER — BUDESONIDE AND FORMOTEROL FUMARATE DIHYDRATE 160; 4.5 UG/1; UG/1
2 AEROSOL RESPIRATORY (INHALATION) 2 TIMES DAILY
Qty: 10.2 G | Refills: 3 | Status: SHIPPED | OUTPATIENT
Start: 2023-12-07

## 2023-12-07 RX ORDER — DESVENLAFAXINE SUCCINATE 50 MG/1
50 TABLET, EXTENDED RELEASE ORAL DAILY
Qty: 30 TABLET | Refills: 3 | Status: SHIPPED | OUTPATIENT
Start: 2023-12-07

## 2023-12-07 NOTE — PROGRESS NOTES
Kylah Vale  : 1995  Encounter date: 2023    This sue 34 y.o. female who presents with  Chief Complaint   Patient presents with    Asthma     Having flare ups and inhaler not working well  Stopped smoking       History of present illness:    HPI History and Physical      Henrine Ku Saralyn Kussmaul  YOB: 1995    Date of Service:  2023    Chief Complaint:   Kylah Vale is a 34 y.o. female who  presents for physical examination. HPI: PT is 34year old female for annual exam and due for labs. Pt with existing depression, controlled and asthma uncontrolled. Pt with home nebulizer, albuterol and advair with little relief. Pt reports quit smoking. Pt has been to urgent care and ED multiple times for asthma exacerbation. PT is not established with pulmonology. Pt also concerned about chronic R medial foot pain started several years ago.   PT reports flat footed, has tried OTC pain relievers, massage with little relief, requesting podiatry referral.    Wt Readings from Last 3 Encounters:   23 (!) 149.2 kg (329 lb)   09/15/23 (!) 149.2 kg (329 lb)   23 134.7 kg (297 lb)     BP Readings from Last 3 Encounters:   09/15/23 136/84   23 108/84   23 121/80       Patient Active Problem List   Diagnosis    Moderate persistent asthma with acute exacerbation    Morbid obesity with body mass index (BMI) of 40.0 to 44.9 in adult [facility])    Acute sinusitis    Fever    Moderate episode of recurrent major depressive disorder (HCC)    Alcohol withdrawal syndrome without complication ([address] [facility])    Hidradenitis    Smoker    Other herpesviral infection       Preventive Care:  Health Maintenance   Topic Date Due    Varicella vaccine (2 of 2 - 2-dose childhood series) 2001    Pneumococcal 0-64 years Vaccine (1 - PCV) 2003    HPV vaccine (1 - 2-dose series) Never done    DTaP/Tdap/Td vaccine (6 - Tdap) 2008    Pap smear  Never done    Flu vaccine (1) Never done    COVID-19

## 2024-03-04 DIAGNOSIS — J45.41 MODERATE PERSISTENT ASTHMA WITH ACUTE EXACERBATION: ICD-10-CM

## 2024-03-04 DIAGNOSIS — J45.41 MODERATE PERSISTENT ASTHMA WITH ACUTE EXACERBATION: Primary | ICD-10-CM

## 2024-03-04 RX ORDER — BUDESONIDE AND FORMOTEROL FUMARATE DIHYDRATE 160; 4.5 UG/1; UG/1
2 AEROSOL RESPIRATORY (INHALATION) 2 TIMES DAILY
Qty: 10.2 G | Refills: 3 | Status: SHIPPED | OUTPATIENT
Start: 2024-03-04

## 2024-03-04 RX ORDER — ALBUTEROL SULFATE 90 UG/1
2 AEROSOL, METERED RESPIRATORY (INHALATION) 4 TIMES DAILY PRN
Qty: 54 G | Refills: 1 | Status: SHIPPED | OUTPATIENT
Start: 2024-03-04

## 2024-03-04 RX ORDER — METHYLPREDNISOLONE 4 MG/1
TABLET ORAL
Qty: 1 KIT | Refills: 0 | Status: SHIPPED | OUTPATIENT
Start: 2024-03-04 | End: 2024-03-10

## 2024-07-20 DIAGNOSIS — J45.41 MODERATE PERSISTENT ASTHMA WITH ACUTE EXACERBATION: ICD-10-CM

## 2024-07-22 RX ORDER — ALBUTEROL SULFATE 90 UG/1
2 AEROSOL, METERED RESPIRATORY (INHALATION) 4 TIMES DAILY PRN
Qty: 54 G | Refills: 1 | Status: SHIPPED | OUTPATIENT
Start: 2024-07-22

## 2025-03-05 DIAGNOSIS — J45.41 MODERATE PERSISTENT ASTHMA WITH ACUTE EXACERBATION: ICD-10-CM

## 2025-03-05 RX ORDER — ALBUTEROL SULFATE 90 UG/1
2 INHALANT RESPIRATORY (INHALATION) 4 TIMES DAILY PRN
Qty: 54 G | Refills: 1 | OUTPATIENT
Start: 2025-03-05

## 2025-03-05 RX ORDER — BUDESONIDE AND FORMOTEROL FUMARATE DIHYDRATE 160; 4.5 UG/1; UG/1
2 AEROSOL RESPIRATORY (INHALATION) 2 TIMES DAILY
Qty: 10.2 G | Refills: 3 | OUTPATIENT
Start: 2025-03-05

## 2025-03-28 ENCOUNTER — OFFICE VISIT (OUTPATIENT)
Age: 30
End: 2025-03-28

## 2025-03-28 VITALS
WEIGHT: 293 LBS | DIASTOLIC BLOOD PRESSURE: 95 MMHG | BODY MASS INDEX: 53.1 KG/M2 | TEMPERATURE: 98.6 F | HEART RATE: 109 BPM | SYSTOLIC BLOOD PRESSURE: 130 MMHG | OXYGEN SATURATION: 94 %

## 2025-03-28 DIAGNOSIS — R05.9 COUGH WITH FEVER: ICD-10-CM

## 2025-03-28 DIAGNOSIS — J45.31 MILD PERSISTENT REACTIVE AIRWAY DISEASE WITH WHEEZING WITH ACUTE EXACERBATION: Primary | ICD-10-CM

## 2025-03-28 DIAGNOSIS — R50.9 COUGH WITH FEVER: ICD-10-CM

## 2025-03-28 LAB
INFLUENZA VIRUS A RNA: NORMAL
INFLUENZA VIRUS B RNA: NORMAL

## 2025-03-28 RX ORDER — ALBUTEROL SULFATE 90 UG/1
2 INHALANT RESPIRATORY (INHALATION) 4 TIMES DAILY PRN
Qty: 18 G | Refills: 0 | Status: SHIPPED | OUTPATIENT
Start: 2025-03-28

## 2025-03-28 RX ORDER — BROMPHENIRAMINE MALEATE, PSEUDOEPHEDRINE HYDROCHLORIDE, AND DEXTROMETHORPHAN HYDROBROMIDE 2; 30; 10 MG/5ML; MG/5ML; MG/5ML
5 SYRUP ORAL 4 TIMES DAILY PRN
Qty: 118 ML | Refills: 0 | Status: SHIPPED | OUTPATIENT
Start: 2025-03-28

## 2025-03-28 RX ORDER — AZITHROMYCIN 250 MG/1
TABLET, FILM COATED ORAL
Qty: 6 TABLET | Refills: 0 | Status: SHIPPED | OUTPATIENT
Start: 2025-03-28 | End: 2025-04-07

## 2025-03-28 RX ORDER — PREDNISONE 20 MG/1
20 TABLET ORAL 2 TIMES DAILY
Qty: 10 TABLET | Refills: 0 | Status: SHIPPED | OUTPATIENT
Start: 2025-03-28 | End: 2025-04-02

## 2025-03-28 RX ORDER — MONTELUKAST SODIUM 10 MG/1
10 TABLET ORAL DAILY
Qty: 30 TABLET | Refills: 3 | Status: SHIPPED | OUTPATIENT
Start: 2025-03-28

## 2025-03-28 ASSESSMENT — ENCOUNTER SYMPTOMS
WHEEZING: 1
COUGH: 1

## (undated) DEVICE — INTENDED FOR TISSUE SEPARATION, AND OTHER PROCEDURES THAT REQUIRE A SHARP SURGICAL BLADE TO PUNCTURE OR CUT.: Brand: BARD-PARKER ® STAINLESS STEEL BLADES

## (undated) DEVICE — ADHESIVE SKIN CLSR 0.7ML TOP DERMBND ADV

## (undated) DEVICE — PENCIL SMK EVAC L10FT TBNG NONSTICK ESU BLDE PLUMEPEN ELITE

## (undated) DEVICE — GLOVE SURG SZ 6.5 L11.2IN FNGR THK9.8MIL STRW LTX POLYMER

## (undated) DEVICE — BLADE ES ELASTOMERIC COAT INSUL DURABLE BEND UPTO 90DEG

## (undated) DEVICE — PENCIL SMK EVAC TELSCP 3 M TBNG

## (undated) DEVICE — YANKAUER,BULB TIP,W/O VENT,RIGID,STERILE: Brand: MEDLINE

## (undated) DEVICE — DRAPE ADOLESCENT  LAPAROTOMY

## (undated) DEVICE — MEDICINE CUP, GRADUATED, STER: Brand: MEDLINE

## (undated) DEVICE — BANDAGE,GAUZE,BULKEE II,4.5"X4.1YD,STRL: Brand: MEDLINE

## (undated) DEVICE — PACK PROCEDURE SURG EXTREMITY MFFOP CUST

## (undated) DEVICE — PAD,ABDOMINAL,8"X10",ST,LF: Brand: MEDLINE

## (undated) DEVICE — 3M™ TEGADERM™ TRANSPARENT FILM DRESSING FRAME STYLE, 1626W, 4 IN X 4-3/4 IN (10 CM X 12 CM), 50/CT 4CT/CASE: Brand: 3M™ TEGADERM™

## (undated) DEVICE — DRAPE,LAPAROTOMY,PED,STERILE: Brand: MEDLINE

## (undated) DEVICE — GAUZE,SPONGE,4"X4",8PLY,STRL,LF,10/TRAY: Brand: MEDLINE

## (undated) DEVICE — TOWEL,OR,DSP,ST,BLUE,STD,4/PK,20PK/CS: Brand: MEDLINE

## (undated) DEVICE — SUTURE VCRL + SZ 3-0 L18IN ABSRB UD SH 1/2 CIR TAPERCUT NDL VCP864D